# Patient Record
Sex: MALE | Race: WHITE | Employment: OTHER | URBAN - METROPOLITAN AREA
[De-identification: names, ages, dates, MRNs, and addresses within clinical notes are randomized per-mention and may not be internally consistent; named-entity substitution may affect disease eponyms.]

---

## 2017-05-17 ENCOUNTER — HOSPITAL ENCOUNTER (OUTPATIENT)
Dept: NON INVASIVE DIAGNOSTICS | Age: 67
Discharge: HOME | End: 2017-05-17
Attending: FAMILY MEDICINE
Payer: COMMERCIAL

## 2017-05-17 DIAGNOSIS — R07.9 CHEST PAIN ON EXERTION: ICD-10-CM

## 2017-05-17 LAB
ATTENDING PHYSICIAN, CST07: NORMAL
DIAGNOSIS, 93000: NORMAL
DUKE TM SCORE RESULT, CST14: NORMAL
DUKE TREADMILL SCORE, CST13: NORMAL
ECG INTERP BEFORE EX, CST11: NORMAL
ECG INTERP DURING EX, CST12: NORMAL
FUNCTIONAL CAPACITY, CST17: NORMAL
KNOWN CARDIAC CONDITION, CST08: NORMAL
MAX. DIASTOLIC BP, CST04: 80 MMHG
MAX. HEART RATE, CST05: 151 BPM
MAX. SYSTOLIC BP, CST03: 158 MMHG
OVERALL BP RESPONSE TO EXERCISE, CST16: NORMAL
OVERALL HR RESPONSE TO EXERCISE, CST15: NORMAL
PEAK EX METS, CST10: 7 METS
PROTOCOL NAME, CST01: NORMAL
TEST INDICATION, CST09: NORMAL

## 2017-05-17 PROCEDURE — 93017 CV STRESS TEST TRACING ONLY: CPT

## 2017-05-17 NOTE — PROGRESS NOTES
Treadmill Stress test done. Patient had abnormal test. Dr. Carina Trinidad spoke with patient. Will notify odereing doctor. Armband removed.

## 2017-05-18 NOTE — PROCEDURES
[facility]  STRESS TEST (TREADMILL)    Name:  Mónica Starkey  MR#:  [id number]  :  1950  Account #:  [de-identified]  Date of Adm:  2017  Date of Service:  2017      PATIENT OF: Feng Ortiz MD    REASON FOR STUDY: Chest pain. ELECTROCARDIOGRAM RESTING: Normal sinus rhythm, rate 80,  within normal limits. PROCEDURE PROTOCOL: Standard Ryan. TOTAL EXERCISE TIME: 4 minutes 38 seconds. ACHIEVED HEART RATE: 151, 97% of predicted maximum. REASON FOR TERMINATION: Fatigue, dyspnea, and chest pain. ST CHANGES: There was 1.5 mm of upsloping ST depression  inferolaterally at peak exercise, which decreased to approximately 1  mm, but then became downsloping at 5 minutes of recovery,  consistent with a positive test.    BLOOD PRESSURE RESPONSE: Normal.    ARRHYTHMIAS: None. SUMMARY  1. Positive maximal stress test with reproduction of chest pain with  exertion with pain resolving within a minute of recovery, but ST  changes persisting through 5 minutes of recovery. 2. This was at least an intermediate risk exercise treadmill study.         DO CYN Scherer / Marilyn.Alcira  D:  2017   18:46  T:  2017   05:15  Job #:  [id number]

## 2017-07-07 ENCOUNTER — OFFICE VISIT (OUTPATIENT)
Dept: CARDIOLOGY CLINIC | Age: 67
End: 2017-07-07

## 2017-07-07 VITALS
BODY MASS INDEX: 25.2 KG/M2 | HEART RATE: 62 BPM | DIASTOLIC BLOOD PRESSURE: 82 MMHG | SYSTOLIC BLOOD PRESSURE: 122 MMHG | WEIGHT: 176 LBS | OXYGEN SATURATION: 98 % | HEIGHT: 70 IN

## 2017-07-07 DIAGNOSIS — Z01.812 PRE-PROCEDURE LAB EXAM: ICD-10-CM

## 2017-07-07 DIAGNOSIS — R94.39 ABNORMAL STRESS TEST: Primary | ICD-10-CM

## 2017-07-07 RX ORDER — ROSUVASTATIN CALCIUM 10 MG/1
TABLET, COATED ORAL
Refills: 0 | COMMUNITY
Start: 2017-06-21

## 2017-07-07 RX ORDER — ASPIRIN 81 MG/1
81 TABLET ORAL DAILY
COMMUNITY

## 2017-07-07 RX ORDER — SODIUM CHLORIDE 0.9 % (FLUSH) 0.9 %
5-10 SYRINGE (ML) INJECTION AS NEEDED
Status: CANCELLED | OUTPATIENT
Start: 2017-07-07

## 2017-07-07 RX ORDER — SODIUM CHLORIDE 0.9 % (FLUSH) 0.9 %
5-10 SYRINGE (ML) INJECTION EVERY 8 HOURS
Status: CANCELLED | OUTPATIENT
Start: 2017-07-07

## 2017-07-07 NOTE — PROGRESS NOTES
1. Have you been to the ER, urgent care clinic since your last visit? Hospitalized since your last visit? no  2. Have you seen or consulted any other health care providers outside of the [address] [place] [address] since your last visit? Include any pap smears or colon screening.  no

## 2017-07-07 NOTE — PROGRESS NOTES
PATIENT NAME: Rayvon Duane         74 y.o.      1950              DATE:7/7/2017    REASON FOR VISIT: Chest pain    HISTORY OF PRESENT ILLNESS: Approximately 4 month history of exertional substernal and right precordial chest pain. The pains are relieved by rest.  He notices them while exercising in his home gym. Notices them at heart rates of approximately 150-155. Exercise for 4 minutes and 38 seconds on a Ryan protocol. Developed chest pain. Developed ST segment abnormalities in the recovery period that were consistent with myocardial ischemia. The patient has a history of hyperlipidemia. He is not hypertensive or diabetic. He does have a history of cigarette smoking. He quit these approximately 10 years ago. Denies dyspnea on exertion, orthopnea, paroxysmal nocturnal dyspnea. Denies palpitation, syncope, presyncope. There is been no problems with bleeding. PAST MEDICAL HISTORY:   Past Medical History:  05/17/2017: Cardiac treadmill stress test      Comment: At least intermediate risk. Positive EKG on                maximal EST. Chest pain resolved within 1 min                of recovery. ST changes persisted 5 mins of                recovery. Ex time 4 min 38 sec. No date: Mixed hyperlipidemia    PAST SURGICAL HISTORY:   Past Surgical History:  No date: HX TONSILLECTOMY      SOCIAL HISTORY:  Social History    Marital status:              Spouse name:                       Years of education:                 Number of children:               Social History Main Topics    Smoking status: Former Smoker                                                                Packs/day: 1.50      Years: 40.00          Quit date: 1/7/2017    Smokeless status: Never Used                        Alcohol use:  Yes               ALLERGIES:   No Known Allergies     CURRENT MEDICATIONS:   Current Outpatient Prescriptions:  aspirin delayed-release 81 mg tablet, Take 81 mg by mouth daily.  rosuvastatin (CRESTOR) 10 mg tablet, take 1 tablet by mouth once daily    No current facility-administered medications for this visit. REVIEW of SYSTEMS:History obtained from chart review and the patient  Hematological and Lymphatic ROS: negative for - bleeding problems  Respiratory ROS: See history of present illness  Cardiovascular ROS: See history of present illness  Gastrointestinal ROS: no abdominal pain, change in bowel habits, or black or bloody stools     PHYSICAL EXAMINATION:   /82 (BP 1 Location: Left arm, BP Patient Position: Sitting)  Pulse 62  Ht 5' 10\" (1.778 m)  Wt 79.8 kg (176 lb)  SpO2 98%  BMI 25.25 kg/m2  BP Readings from Last 3 Encounters:  07/07/17 : 122/82    Pulse Readings from Last 3 Encounters:  07/07/17 : 62    Wt Readings from Last 3 Encounters:  07/07/17 : 79.8 kg (176 lb)    General: Well-developed white male in no apparent distress. HEENT: Sclera clear. Mucous membranes pink and moist.  Neck: No jugular venous distention. Carotid upstrokes 2+ without bruits. Chest: Clear to  auscultation. Heart: PMI not palpable. Regular rhythm. No murmur or gallop. Abdomen: Nontender without masses or organomegaly. Extremities: No edema. Skin: Warm and dry. No stasis changes. Neuro: Alert, oriented, speech WNL, no facial asymmetry. Gait WNL. EKG: Within normal limits    IMPRESSION:   Chest pain, highly suggestive of angina pectoris  Abnormal exercise EKG consistent with myocardial ischemia  Hyperlipidemia    PLAN:  Recommend coronary arteriography. Patient accepts. Will schedule    The diagnoses and plan were discussed with patient. All questions answered. Plan of care agreed to by all concerned. Herberth Gutierrez.  MD [place]       ,

## 2017-07-07 NOTE — MR AVS SNAPSHOT
Visit Information Date & Time Provider Department Dept. Phone Encounter #  
 7/7/2017 11:20 AM Homer Whipple MD [facility] [id number] Follow-up Instructions Follow-up and Disposition History Upcoming Health Maintenance Date Due Hepatitis C Screening 1950 DTaP/Tdap/Td series (1 - Tdap) 4/13/1973 FOBT Q 1 YEAR AGE 50-75 4/13/2002 ZOSTER VACCINE AGE 60> 4/13/2012 GLAUCOMA SCREENING Q2Y 4/13/2017 Pneumococcal 65+ Low/Medium Risk (1 of 2 - PCV13) 4/13/2017 MEDICARE YEARLY EXAM 4/13/2017 INFLUENZA AGE 9 TO ADULT 8/1/2017 Allergies as of 7/7/2017  Review Complete On: 7/7/2017 By: Hildreth Mcardle No Known Allergies Current Immunizations  Never Reviewed No immunizations on file. Not reviewed this visit You Were Diagnosed With   
  
 Codes Comments Abnormal stress test    -  Primary ICD-10-CM: R94.39 
ICD-9-CM: 794.39 Vitals BP Pulse Height(growth percentile) Weight(growth percentile) SpO2 BMI  
 122/82 (BP 1 Location: Left arm, BP Patient Position: Sitting) 62 5' 10\" (1.778 m) 176 lb (79.8 kg) 98% 25.25 kg/m2 Smoking Status Former Smoker Vitals History BMI and BSA Data Body Mass Index Body Surface Area  
 25.25 kg/m 2 1.99 m 2 Your Updated Medication List  
  
   
This list is accurate as of: 7/7/17 12:10 PM.  Always use your most recent med list.  
  
  
  
  
 aspirin delayed-release 81 mg tablet Take 81 mg by mouth daily. rosuvastatin 10 mg tablet Commonly known as:  CRESTOR  
take 1 tablet by mouth once daily We Performed the Following AMB POC EKG ROUTINE W/ 12 LEADS, INTER & REP [60090 CPT(R)] Patient Instructions Instructions Patients Name:  Talbot Landau 1. You are scheduled to have a left heart catherization on 7/13/2017  at 9:15am Please check in at 8:15am  . 2. Please go to [facility] and park in the outpatient parking lot that is located around to the back of the hospital and enter through the [facility] building. Once you enter through the [facility] check in with the  there. The  will either give you directions or assist you in getting to the cath holding area. 3. You are not to eat anything after midnight the morning of the procedure. Please continue to drink fluids up until 6am.  (water, juice, black coffee, soft drinks). Do not drink milk or milk products or anything with cream. 
 
4. If you are diabetic, do not take your insulin/sugar pill the morning of the procedure. 5. MEDICATION INSTRUCTIONS:   Please take your morning medications with the following special instructions: Take your Aspirin and/or Plavix. 6. We encourage families to wait in the waiting room on the first floor while the procedure is being done. The Doctor will come out and talk with you as soon as the procedure is over. 7. There is the possibility that you may spend the night in the hospital, depending on the results of the procedure. This will be determined after the procedure is done. If angioplasty or stent is planned, you will stay at least one day. 8. If you or your family have any questions, please call our office Monday Friday, 9:00 a. m.4:30 p.m.,  At [phone], and ask to speak to one of the nurses. c reaction to the dye. Introducing [facility]! Troy Ewing introduces [employer] patient portal. Now you can access parts of your medical record, email your doctor's office, and request medication refills online. 1. In your internet browser, go to [email or web address] 2. Click on the First Time User? Click Here link in the Sign In box. You will see the New Member Sign Up page. 3. Enter your [employer] Access Code exactly as it appears below.  You will not need to use this code after youve completed the sign-up process. If you do not sign up before the expiration date, you must request a new code. · [employer] Access Code: [email or web address] Expires: 8/8/2017 11:15 AM 
 
4. Enter the last four digits of your Social Security Number (xxxx) and Date of Birth (mm/dd/yyyy) as indicated and click Submit. You will be taken to the next sign-up page. 5. Create a [employer] ID. This will be your [employer] login ID and cannot be changed, so think of one that is secure and easy to remember. 6. Create a [employer] password. You can change your password at any time. 7. Enter your Password Reset Question and Answer. This can be used at a later time if you forget your password. 8. Enter your e-mail address. You will receive e-mail notification when new information is available in [address]. 9. Click Sign Up. You can now view and download portions of your medical record. 10. Click the Download Summary menu link to download a portable copy of your medical information. If you have questions, please visit the Frequently Asked Questions section of the [employer] website. Remember, [employer] is NOT to be used for urgent needs. For medical emergencies, dial 911. Now available from your iPhone and Android! Please provide this summary of care documentation to your next provider. Your primary care clinician is listed as Napoleon Rios. If you have any questions after today's visit, please call [phone].

## 2017-07-11 ENCOUNTER — HOSPITAL ENCOUNTER (OUTPATIENT)
Dept: GENERAL RADIOLOGY | Age: 67
Discharge: HOME | End: 2017-07-11
Payer: COMMERCIAL

## 2017-07-11 ENCOUNTER — HOSPITAL ENCOUNTER (OUTPATIENT)
Dept: LAB | Age: 67
Discharge: HOME | End: 2017-07-11
Payer: COMMERCIAL

## 2017-07-11 DIAGNOSIS — Z01.812 PRE-PROCEDURE LAB EXAM: ICD-10-CM

## 2017-07-11 DIAGNOSIS — R94.39 ABNORMAL STRESS TEST: ICD-10-CM

## 2017-07-11 LAB
ALBUMIN SERPL BCP-MCNC: 4 G/DL (ref 3.4–5)
ALBUMIN/GLOB SERPL: 1.3 {RATIO} (ref 0.8–1.7)
ALP SERPL-CCNC: 65 U/L (ref 45–117)
ALT SERPL-CCNC: 32 U/L (ref 16–61)
ANION GAP BLD CALC-SCNC: 7 MMOL/L (ref 3–18)
AST SERPL W P-5'-P-CCNC: 16 U/L (ref 15–37)
BASOPHILS # BLD AUTO: 0 K/UL (ref 0–0.06)
BASOPHILS # BLD: 0 % (ref 0–2)
BILIRUB SERPL-MCNC: 0.4 MG/DL (ref 0.2–1)
BUN SERPL-MCNC: 17 MG/DL (ref 7–18)
BUN/CREAT SERPL: 20 (ref 12–20)
CALCIUM SERPL-MCNC: 8.5 MG/DL (ref 8.5–10.1)
CHLORIDE SERPL-SCNC: 108 MMOL/L (ref 100–108)
CO2 SERPL-SCNC: 25 MMOL/L (ref 21–32)
CREAT SERPL-MCNC: 0.84 MG/DL (ref 0.6–1.3)
DIFFERENTIAL METHOD BLD: ABNORMAL
EOSINOPHIL # BLD: 0.1 K/UL (ref 0–0.4)
EOSINOPHIL NFR BLD: 2 % (ref 0–5)
ERYTHROCYTE [DISTWIDTH] IN BLOOD BY AUTOMATED COUNT: 11.9 % (ref 11.6–14.5)
GLOBULIN SER CALC-MCNC: 3 G/DL (ref 2–4)
GLUCOSE SERPL-MCNC: 124 MG/DL (ref 74–99)
HCT VFR BLD AUTO: 41.5 % (ref 36–48)
HGB BLD-MCNC: 14.9 G/DL (ref 13–16)
INR PPP: 1 (ref 0.8–1.2)
LYMPHOCYTES # BLD AUTO: 34 % (ref 21–52)
LYMPHOCYTES # BLD: 1.8 K/UL (ref 0.9–3.6)
MCH RBC QN AUTO: 34.1 PG (ref 24–34)
MCHC RBC AUTO-ENTMCNC: 35.9 G/DL (ref 31–37)
MCV RBC AUTO: 95 FL (ref 74–97)
MONOCYTES # BLD: 0.4 K/UL (ref 0.05–1.2)
MONOCYTES NFR BLD AUTO: 8 % (ref 3–10)
NEUTS SEG # BLD: 2.9 K/UL (ref 1.8–8)
NEUTS SEG NFR BLD AUTO: 56 % (ref 40–73)
PLATELET # BLD AUTO: 152 K/UL (ref 135–420)
PMV BLD AUTO: 10.6 FL (ref 9.2–11.8)
POTASSIUM SERPL-SCNC: 4.3 MMOL/L (ref 3.5–5.5)
PROT SERPL-MCNC: 7 G/DL (ref 6.4–8.2)
PROTHROMBIN TIME: 13.3 SEC (ref 11.5–15.2)
RBC # BLD AUTO: 4.37 M/UL (ref 4.7–5.5)
SODIUM SERPL-SCNC: 140 MMOL/L (ref 136–145)
WBC # BLD AUTO: 5.2 K/UL (ref 4.6–13.2)

## 2017-07-11 PROCEDURE — 80053 COMPREHEN METABOLIC PANEL: CPT

## 2017-07-11 PROCEDURE — 85025 COMPLETE CBC W/AUTO DIFF WBC: CPT

## 2017-07-11 PROCEDURE — 36415 COLL VENOUS BLD VENIPUNCTURE: CPT

## 2017-07-11 PROCEDURE — 71020 XR CHEST PA LAT: CPT

## 2017-07-11 PROCEDURE — 85610 PROTHROMBIN TIME: CPT

## 2017-07-13 ENCOUNTER — HOSPITAL ENCOUNTER (OUTPATIENT)
Dept: CARDIAC CATH/INVASIVE PROCEDURES | Age: 67
Setting detail: OBSERVATION
Discharge: HOME | End: 2017-07-14
Attending: INTERNAL MEDICINE | Admitting: INTERNAL MEDICINE
Payer: COMMERCIAL

## 2017-07-13 PROBLEM — I20.0 UNSTABLE ANGINA (HCC): Status: ACTIVE | Noted: 2017-07-13

## 2017-07-13 PROBLEM — I25.10 CAD (CORONARY ARTERY DISEASE): Status: ACTIVE | Noted: 2017-07-13

## 2017-07-13 LAB
ANION GAP BLD CALC-SCNC: 6 MMOL/L (ref 3–18)
ATRIAL RATE: 47 BPM
BUN SERPL-MCNC: 14 MG/DL (ref 7–18)
BUN/CREAT SERPL: 18 (ref 12–20)
CALCIUM SERPL-MCNC: 7.8 MG/DL (ref 8.5–10.1)
CALCULATED P AXIS, ECG09: 47 DEGREES
CALCULATED R AXIS, ECG10: 73 DEGREES
CALCULATED T AXIS, ECG11: 72 DEGREES
CHLORIDE SERPL-SCNC: 108 MMOL/L (ref 100–108)
CO2 SERPL-SCNC: 25 MMOL/L (ref 21–32)
CREAT SERPL-MCNC: 0.76 MG/DL (ref 0.6–1.3)
DIAGNOSIS, 93000: NORMAL
GLUCOSE BLD STRIP.AUTO-MCNC: 248 MG/DL (ref 70–110)
GLUCOSE SERPL-MCNC: 99 MG/DL (ref 74–99)
P-R INTERVAL, ECG05: 222 MS
POTASSIUM SERPL-SCNC: 3.9 MMOL/L (ref 3.5–5.5)
Q-T INTERVAL, ECG07: 446 MS
QRS DURATION, ECG06: 94 MS
QTC CALCULATION (BEZET), ECG08: 394 MS
SODIUM SERPL-SCNC: 139 MMOL/L (ref 136–145)
VENTRICULAR RATE, ECG03: 47 BPM

## 2017-07-13 PROCEDURE — 74011250636 HC RX REV CODE- 250/636: Performed by: INTERNAL MEDICINE

## 2017-07-13 PROCEDURE — 99218 HC RM OBSERVATION: CPT

## 2017-07-13 PROCEDURE — 74011000258 HC RX REV CODE- 258: Performed by: INTERNAL MEDICINE

## 2017-07-13 PROCEDURE — C1894 INTRO/SHEATH, NON-LASER: HCPCS

## 2017-07-13 PROCEDURE — 77030027845 HC BND COM RDL D-STAT TELE -B

## 2017-07-13 PROCEDURE — C1769 GUIDE WIRE: HCPCS

## 2017-07-13 PROCEDURE — 92928 PRQ TCAT PLMT NTRAC ST 1 LES: CPT

## 2017-07-13 PROCEDURE — 93458 L HRT ARTERY/VENTRICLE ANGIO: CPT

## 2017-07-13 PROCEDURE — 74011000250 HC RX REV CODE- 250: Performed by: INTERNAL MEDICINE

## 2017-07-13 PROCEDURE — 80048 BASIC METABOLIC PNL TOTAL CA: CPT | Performed by: INTERNAL MEDICINE

## 2017-07-13 PROCEDURE — 74011250637 HC RX REV CODE- 250/637: Performed by: INTERNAL MEDICINE

## 2017-07-13 PROCEDURE — 77030028790 HC ACC ST CTRL VLV COPLT ABBT -B

## 2017-07-13 PROCEDURE — 82962 GLUCOSE BLOOD TEST: CPT

## 2017-07-13 PROCEDURE — 99153 MOD SED SAME PHYS/QHP EA: CPT

## 2017-07-13 PROCEDURE — C1874 STENT, COATED/COV W/DEL SYS: HCPCS

## 2017-07-13 PROCEDURE — 77030013797 HC KT TRNSDUC PRSSR EDWD -A

## 2017-07-13 PROCEDURE — 99152 MOD SED SAME PHYS/QHP 5/>YRS: CPT

## 2017-07-13 PROCEDURE — C1887 CATHETER, GUIDING: HCPCS

## 2017-07-13 PROCEDURE — C1725 CATH, TRANSLUMIN NON-LASER: HCPCS

## 2017-07-13 PROCEDURE — 93005 ELECTROCARDIOGRAM TRACING: CPT

## 2017-07-13 PROCEDURE — 74011250636 HC RX REV CODE- 250/636

## 2017-07-13 PROCEDURE — 77030015766

## 2017-07-13 PROCEDURE — 74011636320 HC RX REV CODE- 636/320: Performed by: INTERNAL MEDICINE

## 2017-07-13 RX ORDER — HEPARIN SODIUM 200 [USP'U]/100ML
1000 INJECTION, SOLUTION INTRAVENOUS ONCE
Status: COMPLETED | OUTPATIENT
Start: 2017-07-13 | End: 2017-07-13

## 2017-07-13 RX ORDER — BIVALIRUDIN 250 MG/5ML
0.75 INJECTION, POWDER, LYOPHILIZED, FOR SOLUTION INTRAVENOUS ONCE
Status: COMPLETED | OUTPATIENT
Start: 2017-07-13 | End: 2017-07-13

## 2017-07-13 RX ORDER — METOPROLOL SUCCINATE 25 MG/1
12.5 TABLET, EXTENDED RELEASE ORAL DAILY
Status: DISCONTINUED | OUTPATIENT
Start: 2017-07-13 | End: 2017-07-14 | Stop reason: HOSPADM

## 2017-07-13 RX ORDER — LIDOCAINE HYDROCHLORIDE 10 MG/ML
1-30 INJECTION, SOLUTION EPIDURAL; INFILTRATION; INTRACAUDAL; PERINEURAL
Status: DISCONTINUED | OUTPATIENT
Start: 2017-07-13 | End: 2017-07-13 | Stop reason: HOSPADM

## 2017-07-13 RX ORDER — METOPROLOL SUCCINATE 25 MG/1
12.5 TABLET, EXTENDED RELEASE ORAL DAILY
Qty: 30 TAB | Refills: 6 | Status: SHIPPED | OUTPATIENT
Start: 2017-07-13 | End: 2017-10-17 | Stop reason: SDUPTHER

## 2017-07-13 RX ORDER — SODIUM CHLORIDE 0.9 % (FLUSH) 0.9 %
5-10 SYRINGE (ML) INJECTION AS NEEDED
Status: DISCONTINUED | OUTPATIENT
Start: 2017-07-13 | End: 2017-07-13 | Stop reason: HOSPADM

## 2017-07-13 RX ORDER — NITROGLYCERIN 400 UG/1
1 SPRAY ORAL
Status: ACTIVE | OUTPATIENT
Start: 2017-07-13 | End: 2017-07-14

## 2017-07-13 RX ORDER — ATROPINE SULFATE 0.1 MG/ML
INJECTION INTRAVENOUS
Status: DISCONTINUED
Start: 2017-07-13 | End: 2017-07-13

## 2017-07-13 RX ORDER — ROSUVASTATIN CALCIUM 10 MG/1
10 TABLET, COATED ORAL
Status: DISCONTINUED | OUTPATIENT
Start: 2017-07-13 | End: 2017-07-14 | Stop reason: HOSPADM

## 2017-07-13 RX ORDER — HEPARIN SODIUM 200 [USP'U]/100ML
1000 INJECTION, SOLUTION INTRAVENOUS ONCE
Status: DISCONTINUED | OUTPATIENT
Start: 2017-07-13 | End: 2017-07-13 | Stop reason: SDUPTHER

## 2017-07-13 RX ORDER — ASPIRIN 81 MG/1
81 TABLET ORAL DAILY
Status: DISCONTINUED | OUTPATIENT
Start: 2017-07-13 | End: 2017-07-14 | Stop reason: HOSPADM

## 2017-07-13 RX ORDER — SODIUM CHLORIDE 0.9 % (FLUSH) 0.9 %
5-10 SYRINGE (ML) INJECTION EVERY 8 HOURS
Status: DISCONTINUED | OUTPATIENT
Start: 2017-07-13 | End: 2017-07-14 | Stop reason: HOSPADM

## 2017-07-13 RX ORDER — VERAPAMIL HYDROCHLORIDE 2.5 MG/ML
5 INJECTION, SOLUTION INTRAVENOUS ONCE
Status: COMPLETED | OUTPATIENT
Start: 2017-07-13 | End: 2017-07-13

## 2017-07-13 RX ORDER — SODIUM CHLORIDE 0.9 % (FLUSH) 0.9 %
5-10 SYRINGE (ML) INJECTION EVERY 8 HOURS
Status: DISCONTINUED | OUTPATIENT
Start: 2017-07-13 | End: 2017-07-13 | Stop reason: HOSPADM

## 2017-07-13 RX ORDER — SODIUM CHLORIDE 0.9 % (FLUSH) 0.9 %
5-10 SYRINGE (ML) INJECTION AS NEEDED
Status: DISCONTINUED | OUTPATIENT
Start: 2017-07-13 | End: 2017-07-14 | Stop reason: HOSPADM

## 2017-07-13 RX ORDER — SODIUM CHLORIDE 450 MG/100ML
150 INJECTION, SOLUTION INTRAVENOUS CONTINUOUS
Status: DISPENSED | OUTPATIENT
Start: 2017-07-13 | End: 2017-07-13

## 2017-07-13 RX ORDER — HEPARIN SODIUM 1000 [USP'U]/ML
1000-10000 INJECTION, SOLUTION INTRAVENOUS; SUBCUTANEOUS
Status: DISCONTINUED | OUTPATIENT
Start: 2017-07-13 | End: 2017-07-13 | Stop reason: HOSPADM

## 2017-07-13 RX ORDER — FENTANYL CITRATE 50 UG/ML
25-100 INJECTION, SOLUTION INTRAMUSCULAR; INTRAVENOUS
Status: DISCONTINUED | OUTPATIENT
Start: 2017-07-13 | End: 2017-07-13 | Stop reason: HOSPADM

## 2017-07-13 RX ORDER — NITROGLYCERIN 400 UG/1
1 SPRAY ORAL
Qty: 1 BOTTLE | Refills: 2 | Status: SHIPPED | OUTPATIENT
Start: 2017-07-13

## 2017-07-13 RX ORDER — BIVALIRUDIN 250 MG/5ML
INJECTION, POWDER, LYOPHILIZED, FOR SOLUTION INTRAVENOUS
Status: COMPLETED
Start: 2017-07-13 | End: 2017-07-13

## 2017-07-13 RX ORDER — MIDAZOLAM HYDROCHLORIDE 1 MG/ML
1-4 INJECTION, SOLUTION INTRAMUSCULAR; INTRAVENOUS
Status: DISCONTINUED | OUTPATIENT
Start: 2017-07-13 | End: 2017-07-13 | Stop reason: HOSPADM

## 2017-07-13 RX ORDER — ASPIRIN 81 MG/1
81 TABLET ORAL DAILY
Status: DISCONTINUED | OUTPATIENT
Start: 2017-07-13 | End: 2017-07-13 | Stop reason: SDUPTHER

## 2017-07-13 RX ORDER — ASPIRIN 81 MG/1
81 TABLET ORAL DAILY
Status: DISCONTINUED | OUTPATIENT
Start: 2017-07-13 | End: 2017-07-13

## 2017-07-13 RX ADMIN — Medication 10 ML: at 22:34

## 2017-07-13 RX ADMIN — NITROGLYCERIN 200 MCG: 5 INJECTION, SOLUTION INTRAVENOUS at 10:35

## 2017-07-13 RX ADMIN — ROSUVASTATIN CALCIUM 10 MG: 10 TABLET, FILM COATED ORAL at 22:30

## 2017-07-13 RX ADMIN — BIVALIRUDIN 60 MG: 250 INJECTION, POWDER, LYOPHILIZED, FOR SOLUTION INTRAVENOUS at 10:01

## 2017-07-13 RX ADMIN — HEPARIN SODIUM 1000 UNITS: 200 INJECTION, SOLUTION INTRAVENOUS at 09:37

## 2017-07-13 RX ADMIN — NITROGLYCERIN 200 MCG: 5 INJECTION, SOLUTION INTRAVENOUS at 09:48

## 2017-07-13 RX ADMIN — SODIUM CHLORIDE 150 ML: 4.5 INJECTION, SOLUTION INTRAVENOUS at 11:30

## 2017-07-13 RX ADMIN — TICAGRELOR 90 MG: 90 TABLET ORAL at 22:31

## 2017-07-13 RX ADMIN — HEPARIN SODIUM 1000 UNITS: 200 INJECTION, SOLUTION INTRAVENOUS at 09:38

## 2017-07-13 RX ADMIN — NITROGLYCERIN 100 MCG: 5 INJECTION, SOLUTION INTRAVENOUS at 09:53

## 2017-07-13 RX ADMIN — Medication 10 ML: at 14:45

## 2017-07-13 RX ADMIN — TICAGRELOR 180 MG: 90 TABLET ORAL at 10:38

## 2017-07-13 RX ADMIN — MIDAZOLAM HYDROCHLORIDE 1 MG: 1 INJECTION, SOLUTION INTRAMUSCULAR; INTRAVENOUS at 09:43

## 2017-07-13 RX ADMIN — IOPAMIDOL 185 ML: 612 INJECTION, SOLUTION INTRAVENOUS at 10:36

## 2017-07-13 RX ADMIN — LIDOCAINE HYDROCHLORIDE 2 ML: 10 INJECTION, SOLUTION EPIDURAL; INFILTRATION; INTRACAUDAL; PERINEURAL at 09:46

## 2017-07-13 RX ADMIN — VERAPAMIL HYDROCHLORIDE 5 MG: 2.5 INJECTION INTRAVENOUS at 09:48

## 2017-07-13 RX ADMIN — FENTANYL CITRATE 25 MCG: 50 INJECTION INTRAMUSCULAR; INTRAVENOUS at 09:43

## 2017-07-13 RX ADMIN — METOPROLOL SUCCINATE 12.5 MG: 25 TABLET, EXTENDED RELEASE ORAL at 12:53

## 2017-07-13 RX ADMIN — BIVALIRUDIN 1.75 MG/KG/HR: 250 INJECTION, POWDER, LYOPHILIZED, FOR SOLUTION INTRAVENOUS at 10:05

## 2017-07-13 RX ADMIN — HEPARIN SODIUM 3000 UNITS: 1000 INJECTION, SOLUTION INTRAVENOUS; SUBCUTANEOUS at 09:51

## 2017-07-13 RX ADMIN — FENTANYL CITRATE 25 MCG: 50 INJECTION INTRAMUSCULAR; INTRAVENOUS at 09:37

## 2017-07-13 RX ADMIN — MIDAZOLAM HYDROCHLORIDE 1 MG: 1 INJECTION, SOLUTION INTRAMUSCULAR; INTRAVENOUS at 09:37

## 2017-07-13 RX ADMIN — ASPIRIN 81 MG: 81 TABLET, COATED ORAL at 22:30

## 2017-07-13 NOTE — DISCHARGE SUMMARY
[facility]  -  Progress Note      Discharge Summary     Patient: Yaneth Dillon MRN: [id number]  SSN: [id number]    YOB: 1950  Age: 74 y.o. Sex: male       Admit Date: 2017    Discharge Date: 2017      Admission Diagnoses: Chest pain, unspecified [R07.9]    Discharge Diagnoses:   Problem List as of 2017  Never Reviewed          Codes Class Noted - Resolved    CAD (coronary artery disease) ICD-10-CM: I25.10  ICD-9-CM: 414.00  2017 - Present        Unstable angina ([address] ICD-10-CM: I20.0  ICD-9-CM: 411.1  2017 - Present               Discharge Condition: Good    Hospital Course: Patient with recent unstable angina and abnormal stress test presented for elective cardiac catheterization. Patient is now s/p successful stenting of proximal RCA 95% lesion and super dominant vessel, as well as 99% proximal/mid LAD lesion to 0%. Drug-eluting stents were utilized in both vessels    Consults: None    Significant Diagnostic Studies: angiography:     HEMODYNAMIC / ANGIOGRAPHIC DATA  · Left ventricle: End diastolic pressure was 11 mmHg. Left ventriculography: The EF was estimated to be around 55 %. There was no diagnostic segmental wall motion abnormality. · Aorta: There was no significant systolic pressure gradient across the aortic valve. · Mitral valve: There was no significant mitral regurgitation. · Coronary Angiography:  · The coronary circulation was right dominant. · Left main: Angiographically normal.   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 · Diagonal Branches: Small to moderate size vessel with ostial 90%.   · Circumflex: Angiographically normal.  · Obtuse Marginal Branches: Angiographically normal.  · Right coronary: Super dominant with proximal 95%.       Disposition: home    Discharge Medications:   Current Discharge Medication List      START taking these medications    Details   metoprolol succinate (TOPROL-XL) 25 mg XL tablet Take 0.5 Tabs by mouth daily. Qty: 30 Tab, Refills: 6      nitroglycerin (NITROLINGUAL) 400 mcg/spray spray 1 Spray by SubLINGual route every five (5) minutes as needed. Qty: 1 Bottle, Refills: 2      ticagrelor (BRILINTA) 90 mg tablet Take 1 Tab by mouth every twelve (12) hours every twelve (12) hours. Qty: 60 Tab, Refills: 11         CONTINUE these medications which have NOT CHANGED    Details   aspirin delayed-release 81 mg tablet Take 81 mg by mouth daily.     Associated Diagnoses: Abnormal stress test; Pre-procedure lab exam      rosuvastatin (CRESTOR) 10 mg tablet take 1 tablet by mouth once daily  Refills: 0    Associated Diagnoses: Abnormal stress test; Pre-procedure lab exam             Activity: Activity as tolerated  Diet: Cardiac Diet  Wound Care: As directed    Follow-up Appointments   Procedures    FOLLOW UP VISIT Appointment in: One Month Dr. Karrie Geller     Standing Status:   Standing     Number of Occurrences:   1     Order Specific Question:   Appointment in     Answer:   One Month       Signed By: ABDI Cui     July 13, 2017

## 2017-07-13 NOTE — ROUTINE PROCESS
TRANSFER - OUT REPORT:    Verbal report given to BART Dong (name) on Alexandra To  being transferred to Cath Lab(unit) for ordered procedure       Report consisted of patients Situation, Background, Assessment and   Recommendations(SBAR). Information from the following report(s) SBAR, MAR and Recent Results was reviewed with the receiving nurse. Lines:   Peripheral IV 07/13/17 Left Arm (Active)       Peripheral IV 07/13/17 Right Arm (Active)        Opportunity for questions and clarification was provided.       Patient transported with:   [employer]

## 2017-07-13 NOTE — PROCEDURES
CARDIAC CATHETERIZATION LABORATORY PROCEDURE REPORT    Talbot Landau is a 74 y.o. male    Medical Record Number: [id number]    Primary Care Provider: Louis Guevara MD      Referral Provider: Elba Espinoza MD    Procedure Date: 7/13/2017    INDICATIONS: Patient with unstable angina, reproducible chest pain on exercise stress test which was abnormal.    MD PERFORMING PROCEDURE: Jun 1912 Fitzgerald MD Nhung    PROCEDURE:  Left heart catheterization, coronary angiography, left ventriculography and coronary artery stenting. ANESTHESIA: Moderate sedation and local anesthesia. EBL: 30-50 ml  Contrast: 185 ml  Radiation exposure: 2380 mGy    Risks, benefits, and alternatives were explained to the patient and appropriate informed consent was obtained prior to the procedure. The patient was brought to the cath lab in a post-absorptive state and he was prepped and draped in the usual sterile manner. Moderate sedation was achieved with the combination of Versed (midazolam) and Fentanyl. Lidocaine was used to secure local anesthesia. The right radial artery was cannulated using a micropuncture kit and a 6F Western Colleen sheath was inserted. The patient received 3000 units of IV Heparin bolus as well as 5 mg Verapamil and 200 microgram NTG through the sheath to prevent arterial vasospasm. Six Western Colleen Mylene Savin catheter was used to obtain selective bilateral coronary angiograms in multiple projections as well as LV angiography in the BALTAZAR projection with a hand injection. Pressures were recorded in the LV and during pull back across the aortic valve. The following were observed. FLUOROSCOPY: There was mild apparent calcification. HEMODYNAMIC / ANGIOGRAPHIC DATA  · Left ventricle: End diastolic pressure was 11 mmHg. Left ventriculography: The EF was estimated to be around 55 %. There was no diagnostic segmental wall motion abnormality. · Aorta:  There was no significant systolic pressure gradient across the aortic valve.  · Mitral valve: There was no significant mitral regurgitation. · Coronary Angiography:  · The coronary circulation was right dominant. · Left main: Angiographically normal.   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 · Diagonal Branches: Small to moderate size vessel with ostial 90%. · Circumflex: Angiographically normal.  · Obtuse Marginal Branches: Angiographically normal.  · Right coronary: Super dominant with proximal 95%. INTERVENTION:  A weight adjusted loading dose of IV Angiomax was given and infusion of the drug was started . Using a 6 Western Colleen JR4 guiding catheter, the lesion in the right coronary artery was crossed with a 0.014\" Balance guide wire. Predilatation was performed with 3.0 mm PTCA balloon (15 mm length) followed by stenting with 4.0 mm Xience RADHA (15 mm length) stent. Additional balloon expansion with 5 mm balloon was carried out using higher inflation pressures. Intracoronary NTG was used during the procedure. Excellent angiographic results were observed and STEFANI 3 flow was noted after the PCI. Using a 6 Western Colleen AL-1 guiding catheter, the lesion in the LAD coronary artery was crossed with a 0.014\" Balance guide wire. Predilatation was performed with 3.0 mm PTCA balloon ( 15 mm length) followed by stenting with 4.0 mm Xience RADHA (15 mm length) stent. Additional balloon expansion was carried out using higher inflation pressures. Intracoronary NTG was used during the procedure. Excellent angiographic results were observed and STEFANI 3 flow was noted after the PCI. After an appropriate waiting phase final coronary angiograms were obtained and the catheter was withdrawn. Oral antiplatelet therapy was administered in the cardiac cath lab in the form of aspirin 81 mg and Ticagrelor 180 mg. The procedure was well tolerated and there was no apparent immediate complication.  The sheath was removed and hemostasis was accomplished using D-Stat Hemoband. The patient was transferred to the observation area with stable vital signs and in an asymptomatic state. SUMMARY:  Successful stenting of proximal RCA 95% lesion and super dominant vessel, as well as 99% proximal/mid LAD lesion to 0%. Drug-eluting stents were utilized in both vessels. No apparent immediate complication. Moderate sedation was utilized for 68 minutes using Versed and fentanyl under my supervision. RECOMMENDATIONS:  Post-procedure care will focus on aggressive risk factor modification. Benefits of tobacco abstinence discussed.     Electronically signed Sangeeta Caldera MD

## 2017-07-13 NOTE — H&P
PATIENT NAME: Yaneth January         74 y.o.      1950              DATE:7/7/2017     REASON FOR VISIT: Chest pain     HISTORY OF PRESENT ILLNESS: Approximately 4 month history of exertional substernal and right precordial chest pain. The pains are relieved by rest.  He notices them while exercising in his home gym. Notices them at heart rates of approximately 150-155. Exercise for 4 minutes and 38 seconds on a Ryan protocol. Developed chest pain. Developed ST segment abnormalities in the recovery period that were consistent with myocardial ischemia.     The patient has a history of hyperlipidemia. He is not hypertensive or diabetic. He does have a history of cigarette smoking. He quit these approximately 10 years ago.     Denies dyspnea on exertion, orthopnea, paroxysmal nocturnal dyspnea. Denies palpitation, syncope, presyncope.     There is been no problems with bleeding.     PAST MEDICAL HISTORY:   Past Medical History:  05/17/2017: Cardiac treadmill stress test      Comment: At least intermediate risk. Positive EKG on                maximal EST. Chest pain resolved within 1 min                of recovery. ST changes persisted 5 mins of                recovery. Ex time 4 min 38 sec. No date: Mixed hyperlipidemia     PAST SURGICAL HISTORY:   Past Surgical History:  No date: HX TONSILLECTOMY      SOCIAL HISTORY:  Social History    Marital status:              Spouse name:                       Years of education:                 Number of children:                Social History Main Topics    Smoking status: Former Smoker                                                                Packs/day: 1.50      Years: 40.00          Quit date: 1/7/2017    Smokeless status: Never Used                        Alcohol use:  Yes                 ALLERGIES:   No Known Allergies      CURRENT MEDICATIONS:   Current Outpatient Prescriptions:  aspirin delayed-release 81 mg tablet, Take 81 mg by mouth daily. rosuvastatin (CRESTOR) 10 mg tablet, take 1 tablet by mouth once daily     No current facility-administered medications for this visit.         REVIEW of SYSTEMS:History obtained from chart review and the patient  Hematological and Lymphatic ROS: negative for - bleeding problems  Respiratory ROS: See history of present illness  Cardiovascular ROS: See history of present illness  Gastrointestinal ROS: no abdominal pain, change in bowel habits, or black or bloody stools      PHYSICAL EXAMINATION:   /82 (BP 1 Location: Left arm, BP Patient Position: Sitting)  Pulse 62  Ht 5' 10\" (1.778 m)  Wt 79.8 kg (176 lb)  SpO2 98%  BMI 25.25 kg/m2  BP Readings from Last 3 Encounters:  07/07/17 : 122/82     Pulse Readings from Last 3 Encounters:  07/07/17 : 62     Wt Readings from Last 3 Encounters:  07/07/17 : 79.8 kg (176 lb)     General: Well-developed white male in no apparent distress. HEENT: Sclera clear. Mucous membranes pink and moist.  Neck: No jugular venous distention. Carotid upstrokes 2+ without bruits. Chest: Clear to  auscultation. Heart: PMI not palpable. Regular rhythm. No murmur or gallop. Abdomen: Nontender without masses or organomegaly. Extremities: No edema. Skin: Warm and dry. No stasis changes. Neuro: Alert, oriented, speech WNL, no facial asymmetry. Gait WNL.    EKG: Within normal limits     IMPRESSION:   Chest pain, highly suggestive of angina pectoris  Abnormal exercise EKG consistent with myocardial ischemia  Hyperlipidemia     PLAN:  Recommend coronary arteriography. Patient accepts. Will schedule     The diagnoses and plan were discussed with patient. All questions answered. Plan of care agreed to by all concerned.  Ade Vasquez. MD [place]      Addendum:    Abnormal stress test with exertional chest pains. Will proceed with coronary angiography.     [facility]  7/13/17

## 2017-07-13 NOTE — ROUTINE PROCESS
TRANSFER - OUT REPORT:    Verbal report given to [facility] RN(name) on Arsalan Ewing  being transferred to [facility]) for routine progression of care       Report consisted of patients Situation, Background, Assessment and   Recommendations(SBAR). Information from the following report(s) SBAR, Procedure Summary, Intake/Output and MAR was reviewed with the receiving nurse. Lines:   Peripheral IV 07/13/17 Left Arm (Active)       Peripheral IV 07/13/17 Right Arm (Active)        Opportunity for questions and clarification was provided.       Patient transported with:   [employer]

## 2017-07-13 NOTE — ROUTINE PROCESS
TRANSFER - IN REPORT:    Verbal report received from Lennox Vuong RN(name) on Rhianna Levy  being received from [facility](unit) for routine progression of care      Report consisted of patients Situation, Background, Assessment and   Recommendations(SBAR). Information from the following report(s) SBAR and Procedure Summary was reviewed with the receiving nurse. Opportunity for questions and clarification was provided. Assessment completed upon patients arrival to unit and care assumed.

## 2017-07-13 NOTE — PROGRESS NOTES
met with patient, completed the initial Spiritual Assessment of the patient, and offered Pastoral Care, see flow sheets for interventions. Patient said he is [employer]. He said he is in hospital overnight for a procedure and hopes to be discharged tomorrow. Pastoral support provided. Patient does not have any Denominational/cultural needs that will affect patients preferences in health care. Chart reviewed. Chaplains will continue to follow and will provide pastoral care on an as needed/as requested basis. Haroon Olson MDiv.   Board Certified [employer] [phone]

## 2017-07-13 NOTE — ROUTINE PROCESS
TRANSFER - OUT REPORT:    Verbal report given to CVT Stepdown Nurse, Mitchel Flor (name) on Zach Shown  being transferred to CVT , 2309(unit) for routine progression of care       Report consisted of patients Situation, Background, Assessment and   Recommendations(SBAR). Information from the following report(s) SBAR, Procedure Summary, MAR and Recent Results was reviewed with the receiving nurse. Lines:   Peripheral IV 07/13/17 Left Arm (Active)       Peripheral IV 07/13/17 Right Arm (Active)        Opportunity for questions and clarification was provided.       Patient transported with:   Registered Nurse

## 2017-07-13 NOTE — PROGRESS NOTES
D Stat pad removed/bandage applied and arm/wrist restrictions reviewed with patient. Wrist splint applied.

## 2017-07-13 NOTE — IP AVS SNAPSHOT
[id number] [place] [address] 
[phone] Patient: Rhianna Levy MRN: [id number] RCX:[id number] You are allergic to the following No active allergies Recent Documentation Height Weight BMI Smoking Status 1.778 m 84.8 kg 26.82 kg/m2 Former Smoker Emergency Contacts Name Discharge Info Relation Home Work Mobile Monika Escalante DISCHARGE CAREGIVER [3] Spouse [3]   [phone] About your hospitalization You were admitted on:  July 13, 2017 You last received care in the:  [facility] 2 CV STEPDOWN You were discharged on:  July 14, 2017 Unit phone number:  [phone] Why you were hospitalized Your primary diagnosis was:  Not on File Your diagnoses also included:  Cad (Coronary Artery Disease), Unstable Angina (Hcc) Providers Seen During Your Hospitalizations Provider Role Specialty Primary office phone Yahir Ramirez MD Attending Provider Cardiology [phone] Your Primary Care Physician (PCP) Primary Care Physician Office Phone Office Fax Pamela Banks, [address] [place] [address] [phone] Follow-up Information Follow up With Details Comments Contact Info Kristan Canales MD Schedule an appointment as soon as possible for a visit in 1 week  [facility] 
[phone] Seymour Kawasaki, MD Schedule an appointment as soon as possible for a visit in 1 month  [address] [facility] [address] [facility] 
[phone] Current Discharge Medication List  
  
START taking these medications Dose & Instructions Dispensing Information Comments Morning Noon Evening Bedtime  
 metoprolol succinate 25 mg XL tablet Commonly known as:  TOPROL-XL Dose:  12.5 mg Take 0.5 Tabs by mouth daily. Quantity:  30 Tab Refills:  6  
     
  
   
   
   
  
 nitroglycerin 400 mcg/spray spray Commonly known as:  Tao Colin Dose:  1 Spray 1 Spray by SubLINGual route every five (5) minutes as needed. Quantity:  1 Bottle Refills:  2  
     
   
   
   
  
 * ticagrelor 90 mg tablet Commonly known as:  [employer] Dose:  90 mg Take 1 Tab by mouth every twelve (12) hours every twelve (12) hours. Quantity:  60 Tab Refills:  11  
     
  
   
   
  
   
  
 * ticagrelor 90 mg tablet Commonly known as:  [employer] Dose:  90 mg Take 1 Tab by mouth two (2) times a day. Quantity:  60 Tab Refills:  0  
     
   
   
   
  
 * Notice: This list has 2 medication(s) that are the same as other medications prescribed for you. Read the directions carefully, and ask your doctor or other care provider to review them with you. CONTINUE these medications which have NOT CHANGED Dose & Instructions Dispensing Information Comments Morning Noon Evening Bedtime  
 aspirin delayed-release 81 mg tablet Dose:  81 mg Take 81 mg by mouth daily. Refills:  0  
     
   
   
   
  
 rosuvastatin 10 mg tablet Commonly known as:  CRESTOR  
   
 take 1 tablet by mouth once daily Refills:  0 Where to Get Your Medications Information on where to get these meds will be given to you by the nurse or doctor. ! Ask your nurse or doctor about these medications  
  metoprolol succinate 25 mg XL tablet  
 nitroglycerin 400 mcg/spray spray  
 ticagrelor 90 mg tablet  
 ticagrelor 90 mg tablet Discharge Instructions Cardiac Catheterization/Angiography Discharge Instructions *Check the puncture site frequently for swelling or bleeding. If you see any bleeding, lie down and apply pressure over the area with a clean town or washcloth. Notify your doctor for any redness, swelling, drainage or oozing from the puncture site. Notify your doctor for any fever or chills. *If the leg or arm with the puncture becomes cold, numb or painful, call Dr Greg Sharp. *Activity should be limited for the next 48 hours. Climb stairs as little as possible and avoid any stooping, bending or strenuous activity for 48 hours. No heavy lifting (anything over 10 pounds) for three days. *Do not drive for 48 hours. *You may resume your usual diet. Drink more fluids than usual. 
 
*Have a responsible person drive you home and stay with you for at least 24 hours after your heart catheterization/angiography. *You may remove the bandage from your Right and Arm in 24 hours. You may shower in 24 hours. No tub baths, hot tubs or swimming for one week. Do not place any lotions, creams, powders, ointments over the puncture site for one week. You may place a clean band-aid over the puncture site each day for 5 days. Change this daily. DISCHARGE SUMMARY from Nurse The following personal items are in your possession at time of discharge: 
 
Dental Appliances: None Visual Aid: None Home Medications: None Jewelry: Watch Clothing: At bedside Other Valuables: Cell Phone, Personal electronic devices (comment) (ipad) PATIENT INSTRUCTIONS: 
 
 
F-face looks uneven A-arms unable to move or move unevenly S-speech slurred or non-existent T-time-call 911 as soon as signs and symptoms begin-DO NOT go Back to bed or wait to see if you get better-TIME IS BRAIN. Warning Signs of HEART ATTACK Call 911 if you have these symptoms: 
? Chest discomfort. Most heart attacks involve discomfort in the center of the chest that lasts more than a few minutes, or that goes away and comes back. It can feel like uncomfortable pressure, squeezing, fullness, or pain. ? Discomfort in other areas of the upper body. Symptoms can include pain or discomfort in one or both arms, the back, neck, jaw, or stomach. ? Shortness of breath with or without chest discomfort. ? Other signs may include breaking out in a cold sweat, nausea, or lightheadedness. Don't wait more than five minutes to call [address]! Fast action can save your life. Calling 911 is almost always the fastest way to get lifesaving treatment. Emergency Medical Services staff can begin treatment when they arrive  up to an hour sooner than if someone gets to the hospital by car. The discharge information has been reviewed with the patient. The patient verbalized understanding. Discharge medications reviewed with the patient and appropriate educational materials and side effects teaching were provided. Discharge Orders None [employer] Announcement We are excited to announce that we are making your provider's discharge notes available to you in [employer]. You will see these notes when they are completed and signed by the physician that discharged you from your recent hospital stay. If you have any questions or concerns about any information you see in [employer], please call the Health Information Department where you were seen or reach out to your Primary Care Provider for more information about your plan of care. Introducing [facility]! Troy Ewing introduces [employer] patient portal. Now you can access parts of your medical record, email your doctor's office, and request medication refills online. 1. In your internet browser, go to [email or web address]"[email or web address]. [employer] [employer] 2. Click on the First Time User? Click Here link in the Sign In box. You will see the New Member Sign Up page. 3. Enter your [employer] Access Code exactly as it appears below. You will not need to use this code after youve completed the sign-up process.  If you do not sign up before the expiration date, you must request a new code. · [employer] Access Code: [email or web address] Expires: 8/8/2017 11:15 AM 
 
4. Enter the last four digits of your Social Security Number (xxxx) and Date of Birth (mm/dd/yyyy) as indicated and click Submit. You will be taken to the next sign-up page. 5. Create a [employer] ID. This will be your [employer] login ID and cannot be changed, so think of one that is secure and easy to remember. 6. Create a [employer] password. You can change your password at any time. 7. Enter your Password Reset Question and Answer. This can be used at a later time if you forget your password. 8. Enter your e-mail address. You will receive e-mail notification when new information is available in [address] 9. Click Sign Up. You can now view and download portions of your medical record. 10. Click the Download Summary menu link to download a portable copy of your medical information. If you have questions, please visit the Frequently Asked Questions section of the [employer] website. Remember, [employer] is NOT to be used for urgent needs. For medical emergencies, dial 911. Now available from your iPhone and Android! General Information Please provide this summary of care documentation to your next provider. Patient Signature:  ____________________________________________________________ Date:  ____________________________________________________________  
  
Lindon Rafal Provider Signature:  ____________________________________________________________ Date:  ____________________________________________________________

## 2017-07-13 NOTE — PROGRESS NOTES
Assumed care of patient approx [phone]; admission required documents completed by ENRIQUE Solis. Oriented patient to room; tele on; vitals WNL; right wrist dressing dry/intact.

## 2017-07-13 NOTE — IP AVS SNAPSHOT
Current Discharge Medication List  
  
START taking these medications Dose & Instructions Dispensing Information Comments Morning Noon Evening Bedtime  
 metoprolol succinate 25 mg XL tablet Commonly known as:  TOPROL-XL Dose:  12.5 mg Take 0.5 Tabs by mouth daily. Quantity:  30 Tab Refills:  6  
     
  
   
   
   
  
 nitroglycerin 400 mcg/spray spray Commonly known as:  Merlene Burkai Dose:  1 Spray 1 Spray by SubLINGual route every five (5) minutes as needed. Quantity:  1 Bottle Refills:  2  
     
   
   
   
  
 * ticagrelor 90 mg tablet Commonly known as:  [employer] Dose:  90 mg Take 1 Tab by mouth every twelve (12) hours every twelve (12) hours. Quantity:  60 Tab Refills:  11  
     
  
   
   
  
   
  
 * ticagrelor 90 mg tablet Commonly known as:  [employer] Dose:  90 mg Take 1 Tab by mouth two (2) times a day. Quantity:  60 Tab Refills:  0  
     
   
   
   
  
 * Notice: This list has 2 medication(s) that are the same as other medications prescribed for you. Read the directions carefully, and ask your doctor or other care provider to review them with you. CONTINUE these medications which have NOT CHANGED Dose & Instructions Dispensing Information Comments Morning Noon Evening Bedtime  
 aspirin delayed-release 81 mg tablet Dose:  81 mg Take 81 mg by mouth daily. Refills:  0  
     
   
   
   
  
 rosuvastatin 10 mg tablet Commonly known as:  CRESTOR  
   
 take 1 tablet by mouth once daily Refills:  0 Where to Get Your Medications Information on where to get these meds will be given to you by the nurse or doctor. ! Ask your nurse or doctor about these medications  
  metoprolol succinate 25 mg XL tablet  
 nitroglycerin 400 mcg/spray spray  
 ticagrelor 90 mg tablet  
 ticagrelor 90 mg tablet

## 2017-07-14 VITALS
RESPIRATION RATE: 16 BRPM | WEIGHT: 186.9 LBS | TEMPERATURE: 97.5 F | BODY MASS INDEX: 26.76 KG/M2 | HEART RATE: 53 BPM | HEIGHT: 70 IN | OXYGEN SATURATION: 96 % | DIASTOLIC BLOOD PRESSURE: 75 MMHG | SYSTOLIC BLOOD PRESSURE: 136 MMHG

## 2017-07-14 LAB
ATRIAL RATE: 62 BPM
CALCULATED P AXIS, ECG09: 62 DEGREES
CALCULATED R AXIS, ECG10: 51 DEGREES
CALCULATED T AXIS, ECG11: 65 DEGREES
DIAGNOSIS, 93000: NORMAL
P-R INTERVAL, ECG05: 202 MS
Q-T INTERVAL, ECG07: 408 MS
QRS DURATION, ECG06: 80 MS
QTC CALCULATION (BEZET), ECG08: 414 MS
VENTRICULAR RATE, ECG03: 62 BPM

## 2017-07-14 PROCEDURE — 74011250637 HC RX REV CODE- 250/637: Performed by: INTERNAL MEDICINE

## 2017-07-14 PROCEDURE — 93005 ELECTROCARDIOGRAM TRACING: CPT

## 2017-07-14 PROCEDURE — 99218 HC RM OBSERVATION: CPT

## 2017-07-14 RX ADMIN — TICAGRELOR 90 MG: 90 TABLET ORAL at 09:17

## 2017-07-14 RX ADMIN — Medication 10 ML: at 06:00

## 2017-07-14 RX ADMIN — METOPROLOL SUCCINATE 12.5 MG: 25 TABLET, EXTENDED RELEASE ORAL at 09:17

## 2017-07-14 NOTE — DISCHARGE INSTRUCTIONS
Cardiac Catheterization/Angiography Discharge Instructions    *Check the puncture site frequently for swelling or bleeding. If you see any bleeding, lie down and apply pressure over the area with a clean town or washcloth. Notify your doctor for any redness, swelling, drainage or oozing from the puncture site. Notify your doctor for any fever or chills. *If the leg or arm with the puncture becomes cold, numb or painful, call Dr Cheikh Garcia. *Activity should be limited for the next 48 hours. Climb stairs as little as possible and avoid any stooping, bending or strenuous activity for 48 hours. No heavy lifting (anything over 10 pounds) for three days. *Do not drive for 48 hours. *You may resume your usual diet. Drink more fluids than usual.    *Have a responsible person drive you home and stay with you for at least 24 hours after your heart catheterization/angiography. *You may remove the bandage from your Right and Arm in 24 hours. You may shower in 24 hours. No tub baths, hot tubs or swimming for one week. Do not place any lotions, creams, powders, ointments over the puncture site for one week. You may place a clean band-aid over the puncture site each day for 5 days. Change this daily. DISCHARGE SUMMARY from Nurse    The following personal items are in your possession at time of discharge:    Dental Appliances: None  Visual Aid: None     Home Medications: None  Jewelry: Watch  Clothing: At bedside  Other Valuables: Cell Phone, Personal electronic devices (comment) (ipad)             PATIENT INSTRUCTIONS:    After general anesthesia or intravenous sedation, for 24 hours or while taking prescription Narcotics:  · Limit your activities  · Do not drive and operate hazardous machinery  · Do not make important personal or business decisions  · Do  not drink alcoholic beverages  · If you have not urinated within 8 hours after discharge, please contact your surgeon on call.     Report the following to your surgeon:  · Excessive pain, swelling, redness or odor of or around the surgical area  · Temperature over 100.5  · Nausea and vomiting lasting longer than 4 hours or if unable to take medications  · Any signs of decreased circulation or nerve impairment to extremity: change in color, persistent  numbness, tingling, coldness or increase pain  · Any questions        What to do at Home:  Recommended activity: Activity as tolerated and No lifting, Driving, or Strenuous exercise for 2 days. *  Please give a list of your current medications to your Primary Care Provider. *  Please update this list whenever your medications are discontinued, doses are      changed, or new medications (including over-the-counter products) are added. *  Please carry medication information at all times in case of emergency situations. These are general instructions for a healthy lifestyle:    No smoking/ No tobacco products/ Avoid exposure to second hand smoke    Surgeon General's Warning:  Quitting smoking now greatly reduces serious risk to your health. Obesity, smoking, and sedentary lifestyle greatly increases your risk for illness    A healthy diet, regular physical exercise & weight monitoring are important for maintaining a healthy lifestyle    You may be retaining fluid if you have a history of heart failure or if you experience any of the following symptoms:  Weight gain of 3 pounds or more overnight or 5 pounds in a week, increased swelling in our hands or feet or shortness of breath while lying flat in bed. Please call your doctor as soon as you notice any of these symptoms; do not wait until your next office visit.     Recognize signs and symptoms of STROKE:    F-face looks uneven    A-arms unable to move or move unevenly    S-speech slurred or non-existent    T-time-call 911 as soon as signs and symptoms begin-DO NOT go       Back to bed or wait to see if you get better-TIME IS BRAIN. Warning Signs of HEART ATTACK     Call 911 if you have these symptoms:   Chest discomfort. Most heart attacks involve discomfort in the center of the chest that lasts more than a few minutes, or that goes away and comes back. It can feel like uncomfortable pressure, squeezing, fullness, or pain.  Discomfort in other areas of the upper body. Symptoms can include pain or discomfort in one or both arms, the back, neck, jaw, or stomach.  Shortness of breath with or without chest discomfort.  Other signs may include breaking out in a cold sweat, nausea, or lightheadedness. Don't wait more than five minutes to call 911 - MINUTES MATTER! Fast action can save your life. Calling 911 is almost always the fastest way to get lifesaving treatment. Emergency Medical Services staff can begin treatment when they arrive -- up to an hour sooner than if someone gets to the hospital by car. The discharge information has been reviewed with the patient. The patient verbalized understanding. Discharge medications reviewed with the patient and appropriate educational materials and side effects teaching were provided.

## 2017-07-14 NOTE — CARDIO/PULMONARY
Cardiac rehab in to see patient. He was in bed stated that he was ready to go home. We discussed relaxation, exercise and nutrition. Patient stated that he does puzzles and sits to relax. Encourged patient to stop during his day and take deep breaths or be alone for a couple minutes to refocus. We then talked about exercise and patient stated that he goes to a gym several times per week. Encouraged patient to take it slowly to get back to what he was doing. We then discussed the outpatient rehab program and he stated that he would think about participating in that to get off to a good start with his exercise. We then discussed his diet. He stated that he eats out a lot because of his job. Encouraged patient to look at the labels or nutrition facts of foods that he chooses so that he is picking the healthiest options available. Patient stated that he appreciated the visit and the information that was given. Will continue to follow throughout his hospital stay.

## 2017-07-14 NOTE — PROGRESS NOTES
Care Management Interventions  Mode of Transport at Discharge:  (wife)  Discharge Durable Medical Equipment: No  Physical Therapy Consult: No  Occupational Therapy Consult: No  Speech Therapy Consult: No  Current Support Network: Lives with Spouse  Confirm Follow Up Transport: Family  Discharge Location  Discharge Placement: Home with family assistance     Pt for dc today after Observation stay. Gave pt Observation info with copy to chart. Pt is AAOx3 ,independent for ambulation & ADLs. Will return home with his wife today. TUYET Rice unit secretary & she states his free fill of Carin Hennessy has already been sent up for pt to take home.

## 2017-07-14 NOTE — ROUTINE PROCESS
I have reviewed discharge instructions and Rx with the patient. The patient verbalized understanding. Pt belongings including ipad and cell phone collected by pt. Pt ambulated to private vehicle with RN escort.

## 2017-07-20 ENCOUNTER — TELEPHONE (OUTPATIENT)
Dept: CARDIAC REHAB | Age: 67
End: 2017-07-20

## 2017-07-20 NOTE — TELEPHONE ENCOUNTER
Cardiac Rehab called patient and left a message about the program. Will follow up.     Thank you,  Morris Arenas

## 2017-08-18 ENCOUNTER — OFFICE VISIT (OUTPATIENT)
Dept: CARDIOLOGY CLINIC | Age: 67
End: 2017-08-18

## 2017-08-18 VITALS
WEIGHT: 182 LBS | SYSTOLIC BLOOD PRESSURE: 128 MMHG | HEART RATE: 68 BPM | HEIGHT: 70 IN | OXYGEN SATURATION: 96 % | BODY MASS INDEX: 26.05 KG/M2 | DIASTOLIC BLOOD PRESSURE: 82 MMHG

## 2017-08-18 DIAGNOSIS — I25.10 CORONARY ARTERY DISEASE INVOLVING NATIVE CORONARY ARTERY OF NATIVE HEART WITHOUT ANGINA PECTORIS: Primary | ICD-10-CM

## 2017-08-18 NOTE — PROGRESS NOTES
1. Have you been to the ER, urgent care clinic since your last visit? Hospitalized since your last visit? S/p stenting of proximal RCA 95% lesion and super dominant vessel, and 99% proximal/mid LAD lesion to 0% 7/13/17  2. Have you seen or consulted any other health care providers outside of the [address] [place] [address] since your last visit? Include any pap smears or colon screening.  no

## 2017-08-18 NOTE — MR AVS SNAPSHOT
Visit Information Date & Time Provider Department Dept. Phone Encounter #  
 8/18/2017  3:00 PM Seymour Kawasaki, MD [facility] [address] [id number] Upcoming Health Maintenance Date Due Hepatitis C Screening 1950 DTaP/Tdap/Td series (1 - Tdap) 4/13/1973 FOBT Q 1 YEAR AGE 50-75 4/13/2002 ZOSTER VACCINE AGE 60> 2/13/2012 GLAUCOMA SCREENING Q2Y 4/13/2017 Pneumococcal 65+ Low/Medium Risk (1 of 2 - PCV13) 4/13/2017 MEDICARE YEARLY EXAM 4/13/2017 INFLUENZA AGE 9 TO ADULT 8/1/2017 Allergies as of 8/18/2017  Review Complete On: 8/18/2017 By: Britney Helm No Known Allergies Current Immunizations  Never Reviewed No immunizations on file. Not reviewed this visit You Were Diagnosed With   
  
 Codes Comments Coronary artery disease involving native coronary artery of native heart without angina pectoris    -  Primary ICD-10-CM: I25.10 ICD-9-CM: 414.01 Vitals BP Pulse Height(growth percentile) Weight(growth percentile) SpO2 BMI  
 128/82 68 5' 10\" (1.778 m) 182 lb (82.6 kg) 96% 26.11 kg/m2 Smoking Status Former Smoker Vitals History BMI and BSA Data Body Mass Index Body Surface Area  
 26.11 kg/m 2 2.02 m 2 Your Updated Medication List  
  
   
This list is accurate as of: 8/18/17  3:42 PM.  Always use your most recent med list.  
  
  
  
  
 aspirin delayed-release 81 mg tablet Take 81 mg by mouth daily. metoprolol succinate 25 mg XL tablet Commonly known as:  TOPROL-XL Take 0.5 Tabs by mouth daily. nitroglycerin 400 mcg/spray spray Commonly known as:  NITROLINGUAL  
1 [place] by SubLINGual route every five (5) minutes as needed. rosuvastatin 10 mg tablet Commonly known as:  CRESTOR  
take 1 tablet by mouth once daily * ticagrelor 90 mg tablet Commonly known as:  [employer] Take 1 Tab by mouth every twelve (12) hours every twelve (12) hours. * ticagrelor 90 mg tablet Commonly known as:  [employer] Take 1 Tab by mouth two (2) times a day. * Notice: This list has 2 medication(s) that are the same as other medications prescribed for you. Read the directions carefully, and ask your doctor or other care provider to review them with you. We Performed the Following AMB POC EKG ROUTINE W/ 12 LEADS, INTER & REP [38195 CPT(R)] Introducing [facility] SERVICES! Praveena Flores introduces [facility] patient portal. Now you can access parts of your medical record, email your doctor's office, and request medication refills online. 1. In your internet browser, go to [email or web address] 2. Click on the First Time User? Click Here link in the Sign In box. You will see the New Member Sign Up page. 3. Enter your [facility] Access Code exactly as it appears below. You will not need to use this code after youve completed the sign-up process. If you do not sign up before the expiration date, you must request a new code. · [facility] Access Code: [email or web address] Expires: 11/11/2017  3:07 PM 
 
4. Enter the last four digits of your Social Security Number (xxxx) and Date of Birth (mm/dd/yyyy) as indicated and click Submit. You will be taken to the next sign-up page. 5. Create a [facility] ID. This will be your [facility] login ID and cannot be changed, so think of one that is secure and easy to remember. 6. Create a [facility] password. You can change your password at any time. 7. Enter your Password Reset Question and Answer. This can be used at a later time if you forget your password. 8. Enter your e-mail address. You will receive e-mail notification when new information is available in [address] 9. Click Sign Up. You can now view and download portions of your medical record.  
10. Click the Download Summary menu link to download a portable copy of your medical information. If you have questions, please visit the Frequently Asked Questions section of the [employer] website. Remember, [employer] is NOT to be used for urgent needs. For medical emergencies, dial 911. Now available from your iPhone and Android! Please provide this summary of care documentation to your next provider. Your primary care clinician is listed as Grace Cavazos. If you have any questions after today's visit, please call [phone].

## 2017-08-23 NOTE — PROGRESS NOTES
PATIENT NAME: Sahra Miner         74 y.o.      1950              DATE:8/18/2017    REASON FOR VISIT: Coronary artery disease    HISTORY OF PRESENT ILLNESS: Status post PTCA and stenting of right coronary artery and LAD lesions. Denies chest pain and other symptoms suggestive of angina. Denies SANCHEZ, PND, orthopnea. Denies palpitation, syncope, presyncope. Denies edema in the lower extremities. Presently taking Crestor for the control of hyperlipidemia. He has questions about diet. I have reviewed these with him. His primary care physician will be obtaining a lipid profile in the near future. Target values discussed. No problems with the Brilinta. No problems with bleeding. Importance of Brilinta therapy discussed. PAST MEDICAL HISTORY:   Past Medical History:  05/17/2017: Cardiac treadmill stress test      Comment: At least intermediate risk. Positive EKG on                maximal EST. Chest pain resolved within 1 min                of recovery. ST changes persisted 5 mins of                recovery. Ex time 4 min 38 sec. No date: Mixed hyperlipidemia    PAST SURGICAL HISTORY:   Past Surgical History:  7/13/2017: CARDIAC CATHETERIZATION      Comment:    7/13/2017: CORONARY ARTERY ANGIOGRAM      Comment:    7/13/2017: CORONARY STENT EA ADDL VESSEL      Comment:    7/13/2017: CORONARY STENT SINGLE W/PTCA      Comment:    No date: HX TONSILLECTOMY  7/13/2017: LV ANGIOGRAPHY      Comment:    7/13/2017: MODERATE SEDATION      Comment:        SOCIAL HISTORY:  Social History    Marital status:              Spouse name:                       Years of education:                 Number of children:               Social History Main Topics    Smoking status: Former Smoker                                                                Packs/day: 1.50      Years: 40.00          Quit date: 1/7/2017    Smokeless status: Never Used                        Alcohol use:  Yes ALLERGIES:   No Known Allergies     CURRENT MEDICATIONS:   Current Outpatient Prescriptions:  metoprolol succinate (TOPROL-XL) 25 mg XL tablet, Take 0.5 Tabs by mouth daily. nitroglycerin (NITROLINGUAL) 400 mcg/spray spray, 1 [place] by SubLINGual route every five (5) minutes as needed. ticagrelor (BRILINTA) 90 mg tablet, Take 1 Tab by mouth every twelve (12) hours every twelve (12) hours. ticagrelor (BRILINTA) 90 mg tablet, Take 1 Tab by mouth two (2) times a day. aspirin delayed-release 81 mg tablet, Take 81 mg by mouth daily. rosuvastatin (CRESTOR) 10 mg tablet, take 1 tablet by mouth once daily    No current facility-administered medications for this visit. REVIEW of SYSTEMS:History obtained from chart review and the patient  General ROS: negative for - weight gain or weight loss  Hematological and Lymphatic ROS: negative for - bleeding problems  Respiratory ROS: no cough, shortness of breath, or wheezing  Cardiovascular ROS: See history of present illness     PHYSICAL EXAMINATION:   /82  Pulse 68  Ht 5' 10\" (1.778 m)  Wt 82.6 kg (182 lb)  SpO2 96%  BMI 26.11 kg/m2  BP Readings from Last 3 Encounters:  08/18/17 : 128/82  07/14/17 : 136/75  07/07/17 : 122/82    Pulse Readings from Last 3 Encounters:  08/18/17 : 68  07/14/17 : (!) 53  07/07/17 : 62    Wt Readings from Last 3 Encounters:  08/18/17 : 82.6 kg (182 lb)  07/14/17 : 84.8 kg (186 lb 14.4 oz)  07/07/17 : 79.8 kg (176 lb)    General: Well-developed white male no apparent distress. HEENT: Sclera clear. Mucous membranes pink and moist.  Neck: No jugular venous distention. Carotid upstrokes 2+ without bruits. Chest: Clear to  auscultation. Heart: PMI not palpable. Regular rhythm. No murmur or gallop. Abdomen: Nontender without masses or organomegaly. Extremities: No edema. Skin: Warm and dry. No stasis changes. Neuro: Alert, oriented, speech WNL, no facial asymmetry. Gait WNL. EKG:  Within normal limits    IMPRESSION: Coronary artery disease, asymptomatic status post PTCA and stenting of LAD and right coronary artery lesions  Hyperlipidemia  Uncomplicated antiplatelet therapy    PLAN:  Medications were reviewed. The importance of each of these discussed with the patient. Lipid therapy discussed. Goals discussed. Activity discussed. Follow-up with PCP. Return in 6 months or as needed    The diagnoses and plan were discussed with patient. All questions answered. Plan of care agreed to by all concerned. Herberth Gutierrez.  MD [place]       ,

## 2017-09-14 ENCOUNTER — TELEPHONE (OUTPATIENT)
Dept: CARDIAC REHAB | Age: 67
End: 2017-09-14

## 2017-09-14 NOTE — TELEPHONE ENCOUNTER
[facility] called patient and spoke to him very briefly about the program. He is not interested and declined services.     Thank you,  Esthela Leon

## 2017-10-17 RX ORDER — METOPROLOL SUCCINATE 25 MG/1
12.5 TABLET, EXTENDED RELEASE ORAL DAILY
Qty: 30 TAB | Refills: 6 | Status: SHIPPED | OUTPATIENT
Start: 2017-10-17 | End: 2017-12-09

## 2017-12-08 ENCOUNTER — HOSPITAL ENCOUNTER (INPATIENT)
Age: 67
LOS: 1 days | Discharge: HOME | DRG: 287 | End: 2017-12-09
Attending: EMERGENCY MEDICINE | Admitting: HOSPITALIST
Payer: COMMERCIAL

## 2017-12-08 ENCOUNTER — APPOINTMENT (OUTPATIENT)
Dept: CT IMAGING | Age: 67
DRG: 287 | End: 2017-12-08
Attending: PHYSICIAN ASSISTANT
Payer: COMMERCIAL

## 2017-12-08 PROBLEM — I21.3 STEMI (ST ELEVATION MYOCARDIAL INFARCTION) (HCC): Status: ACTIVE | Noted: 2017-12-08

## 2017-12-08 LAB
ACT BLD: 164 SECS (ref 79–138)
ALBUMIN SERPL-MCNC: 3.1 G/DL (ref 3.4–5)
ALBUMIN/GLOB SERPL: 1.2 {RATIO} (ref 0.8–1.7)
ALP SERPL-CCNC: 46 U/L (ref 45–117)
ALT SERPL-CCNC: 40 U/L (ref 16–61)
ANION GAP SERPL CALC-SCNC: 6 MMOL/L (ref 3–18)
APTT PPP: 25.6 SEC (ref 23–36.4)
AST SERPL-CCNC: 44 U/L (ref 15–37)
BASOPHILS # BLD: 0 K/UL (ref 0–0.1)
BASOPHILS NFR BLD: 0 % (ref 0–2)
BILIRUB SERPL-MCNC: 0.6 MG/DL (ref 0.2–1)
BUN SERPL-MCNC: 13 MG/DL (ref 7–18)
BUN/CREAT SERPL: 15 (ref 12–20)
CA-I SERPL-SCNC: 1.08 MMOL/L (ref 1.12–1.32)
CALCIUM SERPL-MCNC: 7.1 MG/DL (ref 8.5–10.1)
CHLORIDE SERPL-SCNC: 112 MMOL/L (ref 100–108)
CK MB CFR SERPL CALC: 3.6 % (ref 0–4)
CK MB CFR SERPL CALC: 4.4 % (ref 0–4)
CK MB CFR SERPL CALC: 5.6 % (ref 0–4)
CK MB SERPL-MCNC: 36.1 NG/ML (ref 5–25)
CK MB SERPL-MCNC: 55.9 NG/ML (ref 5–25)
CK MB SERPL-MCNC: 67 NG/ML (ref 5–25)
CK SERPL-CCNC: 1188 U/L (ref 39–308)
CK SERPL-CCNC: 1258 U/L (ref 39–308)
CK SERPL-CCNC: 990 U/L (ref 39–308)
CO2 SERPL-SCNC: 24 MMOL/L (ref 21–32)
CREAT SERPL-MCNC: 0.88 MG/DL (ref 0.6–1.3)
DIFFERENTIAL METHOD BLD: ABNORMAL
EOSINOPHIL # BLD: 0.1 K/UL (ref 0–0.4)
EOSINOPHIL NFR BLD: 1 % (ref 0–5)
ERYTHROCYTE [DISTWIDTH] IN BLOOD BY AUTOMATED COUNT: 12.1 % (ref 11.6–14.5)
ERYTHROCYTE [DISTWIDTH] IN BLOOD BY AUTOMATED COUNT: 12.3 % (ref 11.6–14.5)
GLOBULIN SER CALC-MCNC: 2.6 G/DL (ref 2–4)
GLUCOSE BLD STRIP.AUTO-MCNC: 204 MG/DL (ref 70–110)
GLUCOSE SERPL-MCNC: 98 MG/DL (ref 74–99)
HCT VFR BLD AUTO: 39.9 % (ref 36–48)
HCT VFR BLD AUTO: 41.1 % (ref 36–48)
HGB BLD-MCNC: 14.1 G/DL (ref 13–16)
HGB BLD-MCNC: 14.2 G/DL (ref 13–16)
INR PPP: 1.1 (ref 0.8–1.2)
LYMPHOCYTES # BLD: 1.1 K/UL (ref 0.9–3.6)
LYMPHOCYTES NFR BLD: 14 % (ref 21–52)
MAGNESIUM SERPL-MCNC: 2.1 MG/DL (ref 1.6–2.6)
MCH RBC QN AUTO: 32.9 PG (ref 24–34)
MCH RBC QN AUTO: 33.7 PG (ref 24–34)
MCHC RBC AUTO-ENTMCNC: 34.3 G/DL (ref 31–37)
MCHC RBC AUTO-ENTMCNC: 35.6 G/DL (ref 31–37)
MCV RBC AUTO: 94.8 FL (ref 74–97)
MCV RBC AUTO: 95.8 FL (ref 74–97)
MONOCYTES # BLD: 0.6 K/UL (ref 0.05–1.2)
MONOCYTES NFR BLD: 7 % (ref 3–10)
NEUTS SEG # BLD: 6.3 K/UL (ref 1.8–8)
NEUTS SEG NFR BLD: 78 % (ref 40–73)
PHOSPHATE SERPL-MCNC: 3.2 MG/DL (ref 2.5–4.9)
PLATELET # BLD AUTO: 156 K/UL (ref 135–420)
PLATELET # BLD AUTO: 166 K/UL (ref 135–420)
PMV BLD AUTO: 10.7 FL (ref 9.2–11.8)
PMV BLD AUTO: 10.8 FL (ref 9.2–11.8)
POTASSIUM SERPL-SCNC: 4 MMOL/L (ref 3.5–5.5)
PROT SERPL-MCNC: 5.7 G/DL (ref 6.4–8.2)
PROTHROMBIN TIME: 14.1 SEC (ref 11.5–15.2)
RBC # BLD AUTO: 4.21 M/UL (ref 4.7–5.5)
RBC # BLD AUTO: 4.29 M/UL (ref 4.7–5.5)
SODIUM SERPL-SCNC: 142 MMOL/L (ref 136–145)
T4 FREE SERPL-MCNC: 1 NG/DL (ref 0.7–1.5)
TROPONIN I SERPL-MCNC: 0.17 NG/ML (ref 0–0.04)
TROPONIN I SERPL-MCNC: 0.29 NG/ML (ref 0–0.04)
TROPONIN I SERPL-MCNC: 0.61 NG/ML (ref 0–0.04)
TSH SERPL DL<=0.05 MIU/L-ACNC: 1.17 UIU/ML (ref 0.36–3.74)
WBC # BLD AUTO: 17.1 K/UL (ref 4.6–13.2)
WBC # BLD AUTO: 8.1 K/UL (ref 4.6–13.2)

## 2017-12-08 PROCEDURE — 82330 ASSAY OF CALCIUM: CPT | Performed by: PHYSICIAN ASSISTANT

## 2017-12-08 PROCEDURE — 93306 TTE W/DOPPLER COMPLETE: CPT

## 2017-12-08 PROCEDURE — 74011250637 HC RX REV CODE- 250/637: Performed by: PHYSICIAN ASSISTANT

## 2017-12-08 PROCEDURE — 70450 CT HEAD/BRAIN W/O DYE: CPT

## 2017-12-08 PROCEDURE — 85610 PROTHROMBIN TIME: CPT | Performed by: PHYSICIAN ASSISTANT

## 2017-12-08 PROCEDURE — 74011000258 HC RX REV CODE- 258: Performed by: PHYSICIAN ASSISTANT

## 2017-12-08 PROCEDURE — 82962 GLUCOSE BLOOD TEST: CPT

## 2017-12-08 PROCEDURE — 65660000000 HC RM CCU STEPDOWN

## 2017-12-08 PROCEDURE — 84100 ASSAY OF PHOSPHORUS: CPT | Performed by: PHYSICIAN ASSISTANT

## 2017-12-08 PROCEDURE — 36415 COLL VENOUS BLD VENIPUNCTURE: CPT | Performed by: PHYSICIAN ASSISTANT

## 2017-12-08 PROCEDURE — 80053 COMPREHEN METABOLIC PANEL: CPT | Performed by: PHYSICIAN ASSISTANT

## 2017-12-08 PROCEDURE — 77030018729 CARDIAC CATHETERIZATION

## 2017-12-08 PROCEDURE — 85025 COMPLETE CBC W/AUTO DIFF WBC: CPT | Performed by: PHYSICIAN ASSISTANT

## 2017-12-08 PROCEDURE — 74011250636 HC RX REV CODE- 250/636: Performed by: PHYSICIAN ASSISTANT

## 2017-12-08 PROCEDURE — 83735 ASSAY OF MAGNESIUM: CPT | Performed by: PHYSICIAN ASSISTANT

## 2017-12-08 PROCEDURE — 85347 COAGULATION TIME ACTIVATED: CPT

## 2017-12-08 PROCEDURE — 85730 THROMBOPLASTIN TIME PARTIAL: CPT | Performed by: PHYSICIAN ASSISTANT

## 2017-12-08 PROCEDURE — 75810000275 HC EMERGENCY DEPT VISIT NO LEVEL OF CARE

## 2017-12-08 PROCEDURE — 74011636320 HC RX REV CODE- 636/320: Performed by: INTERNAL MEDICINE

## 2017-12-08 PROCEDURE — 84439 ASSAY OF FREE THYROXINE: CPT | Performed by: PHYSICIAN ASSISTANT

## 2017-12-08 PROCEDURE — 93005 ELECTROCARDIOGRAM TRACING: CPT

## 2017-12-08 PROCEDURE — 85027 COMPLETE CBC AUTOMATED: CPT | Performed by: INTERNAL MEDICINE

## 2017-12-08 PROCEDURE — 77030011256 HC DRSG MEPILEX <16IN NO BORD MOLN -A

## 2017-12-08 PROCEDURE — 82550 ASSAY OF CK (CPK): CPT | Performed by: PHYSICIAN ASSISTANT

## 2017-12-08 PROCEDURE — 84443 ASSAY THYROID STIM HORMONE: CPT | Performed by: PHYSICIAN ASSISTANT

## 2017-12-08 PROCEDURE — 74011250636 HC RX REV CODE- 250/636: Performed by: INTERNAL MEDICINE

## 2017-12-08 RX ORDER — MIDAZOLAM HYDROCHLORIDE 1 MG/ML
1-4 INJECTION, SOLUTION INTRAMUSCULAR; INTRAVENOUS
Status: DISCONTINUED | OUTPATIENT
Start: 2017-12-08 | End: 2017-12-08

## 2017-12-08 RX ORDER — HEPARIN SODIUM 200 [USP'U]/100ML
1000 INJECTION, SOLUTION INTRAVENOUS ONCE
Status: COMPLETED | OUTPATIENT
Start: 2017-12-08 | End: 2017-12-08

## 2017-12-08 RX ORDER — FAMOTIDINE 20 MG/1
20 TABLET, FILM COATED ORAL 2 TIMES DAILY
Status: DISCONTINUED | OUTPATIENT
Start: 2017-12-08 | End: 2017-12-08

## 2017-12-08 RX ORDER — SODIUM CHLORIDE 9 MG/ML
75 INJECTION, SOLUTION INTRAVENOUS CONTINUOUS
Status: DISCONTINUED | OUTPATIENT
Start: 2017-12-08 | End: 2017-12-08

## 2017-12-08 RX ORDER — NOREPINEPHRINE BITARTRATE/D5W 8 MG/250ML
2-16 PLASTIC BAG, INJECTION (ML) INTRAVENOUS
Status: DISCONTINUED | OUTPATIENT
Start: 2017-12-08 | End: 2017-12-08

## 2017-12-08 RX ORDER — GUAIFENESIN 100 MG/5ML
81 LIQUID (ML) ORAL DAILY
Status: DISCONTINUED | OUTPATIENT
Start: 2017-12-08 | End: 2017-12-09 | Stop reason: HOSPADM

## 2017-12-08 RX ORDER — SODIUM CHLORIDE 0.9 % (FLUSH) 0.9 %
5-10 SYRINGE (ML) INJECTION EVERY 8 HOURS
Status: DISCONTINUED | OUTPATIENT
Start: 2017-12-08 | End: 2017-12-09 | Stop reason: HOSPADM

## 2017-12-08 RX ORDER — ACETAMINOPHEN 325 MG/1
650 TABLET ORAL
Status: DISCONTINUED | OUTPATIENT
Start: 2017-12-08 | End: 2017-12-09 | Stop reason: HOSPADM

## 2017-12-08 RX ORDER — HEPARIN SODIUM 1000 [USP'U]/ML
1000-10000 INJECTION, SOLUTION INTRAVENOUS; SUBCUTANEOUS
Status: DISCONTINUED | OUTPATIENT
Start: 2017-12-08 | End: 2017-12-08

## 2017-12-08 RX ORDER — ADHESIVE BANDAGE
30 BANDAGE TOPICAL
Status: DISCONTINUED | OUTPATIENT
Start: 2017-12-08 | End: 2017-12-09 | Stop reason: HOSPADM

## 2017-12-08 RX ORDER — FENTANYL CITRATE 50 UG/ML
25-100 INJECTION, SOLUTION INTRAMUSCULAR; INTRAVENOUS
Status: DISCONTINUED | OUTPATIENT
Start: 2017-12-08 | End: 2017-12-08

## 2017-12-08 RX ORDER — LIDOCAINE HYDROCHLORIDE 10 MG/ML
1-30 INJECTION, SOLUTION EPIDURAL; INFILTRATION; INTRACAUDAL; PERINEURAL
Status: DISCONTINUED | OUTPATIENT
Start: 2017-12-08 | End: 2017-12-08

## 2017-12-08 RX ORDER — ROSUVASTATIN CALCIUM 5 MG/1
10 TABLET, COATED ORAL
Status: DISCONTINUED | OUTPATIENT
Start: 2017-12-08 | End: 2017-12-09 | Stop reason: HOSPADM

## 2017-12-08 RX ORDER — TEMAZEPAM 15 MG/1
15 CAPSULE ORAL
Status: DISCONTINUED | OUTPATIENT
Start: 2017-12-08 | End: 2017-12-09 | Stop reason: HOSPADM

## 2017-12-08 RX ORDER — SODIUM CHLORIDE 0.9 % (FLUSH) 0.9 %
5-10 SYRINGE (ML) INJECTION AS NEEDED
Status: DISCONTINUED | OUTPATIENT
Start: 2017-12-08 | End: 2017-12-09 | Stop reason: HOSPADM

## 2017-12-08 RX ADMIN — IOPAMIDOL 60 ML: 612 INJECTION, SOLUTION INTRAVENOUS at 00:48

## 2017-12-08 RX ADMIN — TICAGRELOR 90 MG: 90 TABLET ORAL at 22:04

## 2017-12-08 RX ADMIN — FENTANYL CITRATE 25 MCG: 50 INJECTION INTRAMUSCULAR; INTRAVENOUS at 00:35

## 2017-12-08 RX ADMIN — Medication 10 ML: at 01:19

## 2017-12-08 RX ADMIN — Medication 10 ML: at 06:00

## 2017-12-08 RX ADMIN — ASPIRIN 81 MG 81 MG: 81 TABLET ORAL at 11:12

## 2017-12-08 RX ADMIN — Medication 10 ML: at 14:00

## 2017-12-08 RX ADMIN — FAMOTIDINE 20 MG: 20 TABLET, FILM COATED ORAL at 08:51

## 2017-12-08 RX ADMIN — HEPARIN SODIUM IN SODIUM CHLORIDE 1000 UNITS: 200 INJECTION INTRAVENOUS at 00:30

## 2017-12-08 RX ADMIN — SODIUM CHLORIDE 75 ML/HR: 900 INJECTION, SOLUTION INTRAVENOUS at 01:17

## 2017-12-08 RX ADMIN — ROSUVASTATIN CALCIUM 10 MG: 5 TABLET, FILM COATED ORAL at 22:05

## 2017-12-08 RX ADMIN — MIDAZOLAM HYDROCHLORIDE 1 MG: 1 INJECTION, SOLUTION INTRAMUSCULAR; INTRAVENOUS at 00:35

## 2017-12-08 RX ADMIN — CALCIUM GLUCONATE 1 G: 94 INJECTION, SOLUTION INTRAVENOUS at 09:59

## 2017-12-08 RX ADMIN — Medication 10 ML: at 22:00

## 2017-12-08 RX ADMIN — TICAGRELOR 90 MG: 90 TABLET ORAL at 11:12

## 2017-12-08 NOTE — PROGRESS NOTES
[facility]  Progress Note    Patient: Aditya Sleeper Age: 74 y.o. : 1950 MR#: [id number] SSN: [id number]  Date/Time: 2017     Subjective:     Review of systems  General: No fevers or chills. Cardiovascular: No chest pain or pressure. No palpitations. Pulmonary: No shortness of breath, cough or wheeze. Gastrointestinal: No abdominal pain, nausea, vomiting or diarrhea. Genitourinary: No urinary frequency, urgency, hesitancy or dysuria. Musculoskeletal: No joint or muscle pain, no back pain, no recent trauma. Neurologic: No headache, numbness, tingling or weakness. Assessment/Plan:   1. Syncope   2 Head injury - close   3 CAD   4 HLD   5 Tobacco abuse   6 H/o ETOH use     PLAN   - S/p Cath - no acute pathology   - Continue to monitor on TELE for another 24 hr for any arrhythmia   - Monitor Lytes   - Continue other home meds       Case discussed with:  [x]Patient  []Family  []Nursing  []Case Management  DVT Prophylaxis:  []Lovenox  []Hep SQ  []SCDs  []Coumadin   []On Heparin gtt    Patient Active Problem List   Diagnosis Code    CAD (coronary artery disease) I25.10    Unstable angina ([facility] [address] I20.0    [facility] I21.3         Objective:   VS:   Visit Vitals    /58    Pulse 70    Temp 98.1 °F (36.7 °C)    Resp 19    Ht 5' 10\" (1.778 m)    Wt 85.7 kg (188 lb 15 oz)    SpO2 99%    BMI 27.11 kg/m2      Tmax/24hrs: Temp (24hrs), Av.9 °F (36.6 °C), Min:97.7 °F (36.5 °C), Max:98.1 °F (36.7 °C)  IOBRIEF  Intake/Output Summary (Last 24 hours) at 17 1345  Last data filed at 17 1000   Gross per 24 hour   Intake           806.64 ml   Output                0 ml   Net           806.64 ml       General:  Alert, cooperative, no acute distress   HEENT:  NC, multiple bruises over left side of face   . PERRLA, anicteric sclerae. Pulmonary:  Bilateral air entry present .  No Wheezing/Rhonchi/Rales. Cardiovascular: Regular rate and Rhythm. GI:  Soft, Non distended, Non tender. + Bowel sounds. Extremities:  No edema, cyanosis, clubbing. No calf tenderness. Psych:  Not anxious or agitated. Neurologic: Grossly - Motor and Sensory functions are intact .       Medications:   Current Facility-Administered Medications   Medication Dose Route Frequency    sodium chloride (NS) flush 5-10 mL  5-10 mL IntraVENous Q8H    sodium chloride (NS) flush 5-10 mL  5-10 mL IntraVENous PRN    acetaminophen (TYLENOL) tablet 650 mg  650 mg Oral Q4H PRN    magnesium hydroxide (MILK OF MAGNESIA) 400 mg/5 mL oral suspension 30 mL  30 mL Oral QHS PRN    temazepam (RESTORIL) capsule 15 mg  15 mg Oral QHS PRN    influenza vaccine 2017-18 (3 yrs+)(PF) (FLUZONE QUAD/FLUARIX QUAD) injection 0.5 mL  0.5 mL IntraMUSCular PRIOR TO DISCHARGE    ticagrelor (BRILINTA) tablet 90 mg  90 mg Oral Q12H    aspirin chewable tablet 81 mg  81 mg Oral DAILY    rosuvastatin (CRESTOR) tablet 10 mg  10 mg Oral QHS       Labs:    Recent Results (from the past 24 hour(s))   CBC W/O DIFF    Collection Time: 12/08/17 12:37 AM   Result Value Ref Range    WBC 17.1 (H) 4.6 - 13.2 K/uL    RBC 4.21 (L) 4.70 - 5.50 M/uL    HGB 14.2 13.0 - 16.0 g/dL    HCT 39.9 36.0 - 48.0 %    MCV 94.8 74.0 - 97.0 FL    MCH 33.7 24.0 - 34.0 PG    MCHC 35.6 31.0 - 37.0 g/dL    RDW 12.1 11.6 - 14.5 %    PLATELET 899 150 - 143 K/uL    MPV 10.8 9.2 - 11.8 FL   POC ACTIVATED CLOTTING TIME    Collection Time: 12/08/17 12:40 AM   Result Value Ref Range    Activated Clotting Time (POC) 164 (H) 79 - 138 SECS   EKG, 12 LEAD, SUBSEQUENT    Collection Time: 12/08/17  1:28 AM   Result Value Ref Range    Ventricular Rate 69 BPM    Atrial Rate 69 BPM    P-R Interval 174 ms    QRS Duration 92 ms    Q-T Interval 406 ms    QTC Calculation (Bezet) 435 ms    Calculated P Axis 57 degrees    Calculated R Axis 51 degrees    Calculated T Axis 68 degrees    Diagnosis Normal sinus rhythm  Normal ECG  When compared with ECG of 14-JUL-2017 04:28,  No significant change was found     CBC WITH AUTOMATED DIFF    Collection Time: 12/08/17  5:01 AM   Result Value Ref Range    WBC 8.1 4.6 - 13.2 K/uL    RBC 4.29 (L) 4.70 - 5.50 M/uL    HGB 14.1 13.0 - 16.0 g/dL    HCT 41.1 36.0 - 48.0 %    MCV 95.8 74.0 - 97.0 FL    MCH 32.9 24.0 - 34.0 PG    MCHC 34.3 31.0 - 37.0 g/dL    RDW 12.3 11.6 - 14.5 %    PLATELET 917 885 - 979 K/uL    MPV 10.7 9.2 - 11.8 FL    NEUTROPHILS 78 (H) 40 - 73 %    LYMPHOCYTES 14 (L) 21 - 52 %    MONOCYTES 7 3 - 10 %    EOSINOPHILS 1 0 - 5 %    BASOPHILS 0 0 - 2 %    ABS. NEUTROPHILS 6.3 1.8 - 8.0 K/UL    ABS. LYMPHOCYTES 1.1 0.9 - 3.6 K/UL    ABS. MONOCYTES 0.6 0.05 - 1.2 K/UL    ABS. EOSINOPHILS 0.1 0.0 - 0.4 K/UL    ABS. BASOPHILS 0.0 0.0 - 0.1 K/UL    DF AUTOMATED     METABOLIC PANEL, COMPREHENSIVE    Collection Time: 12/08/17  5:01 AM   Result Value Ref Range    Sodium 142 136 - 145 mmol/L    Potassium 4.0 3.5 - 5.5 mmol/L    Chloride 112 (H) 100 - 108 mmol/L    CO2 24 21 - 32 mmol/L    Anion gap 6 3.0 - 18 mmol/L    Glucose 98 74 - 99 mg/dL    BUN 13 7.0 - 18 MG/DL    Creatinine 0.88 0.6 - 1.3 MG/DL    BUN/Creatinine ratio 15 12 - 20      GFR est AA >60 >60 ml/min/1.73m2    GFR est non-AA >60 >60 ml/min/1.73m2    Calcium 7.1 (L) 8.5 - 10.1 MG/DL    Bilirubin, total 0.6 0.2 - 1.0 MG/DL    ALT (SGPT) 40 16 - 61 U/L    AST (SGOT) 44 (H) 15 - 37 U/L    Alk.  phosphatase 46 45 - 117 U/L    Protein, total 5.7 (L) 6.4 - 8.2 g/dL    Albumin 3.1 (L) 3.4 - 5.0 g/dL    Globulin 2.6 2.0 - 4.0 g/dL    A-G Ratio 1.2 0.8 - 1.7     MAGNESIUM    Collection Time: 12/08/17  5:01 AM   Result Value Ref Range    Magnesium 2.1 1.6 - 2.6 mg/dL   PHOSPHORUS    Collection Time: 12/08/17  5:01 AM   Result Value Ref Range    Phosphorus 3.2 2.5 - 4.9 MG/DL   TSH 3RD GENERATION    Collection Time: 12/08/17  5:01 AM   Result Value Ref Range    TSH 1.17 0.36 - 3.74 uIU/mL   T4, FREE Collection Time: 12/08/17  5:01 AM   Result Value Ref Range    T4, Free 1.0 0.7 - 1.5 NG/DL   CARDIAC PANEL,(CK, CKMB & TROPONIN)    Collection Time: 12/08/17  5:01 AM   Result Value Ref Range    CK 1188 (H) 39 - 308 U/L    CK - MB 67.0 (H) <3.6 ng/ml    CK-MB Index 5.6 (H) 0.0 - 4.0 %    Troponin-I, Qt. 0.61 (H) 0.0 - 0.045 NG/ML   PROTHROMBIN TIME + INR    Collection Time: 12/08/17  5:01 AM   Result Value Ref Range    Prothrombin time 14.1 11.5 - 15.2 sec    INR 1.1 0.8 - 1.2     PTT    Collection Time: 12/08/17  5:01 AM   Result Value Ref Range    aPTT 25.6 23.0 - 36.4 SEC   CALCIUM, IONIZED    Collection Time: 12/08/17  5:01 AM   Result Value Ref Range    Ionized Calcium 1.08 (L) 1.12 - 1.32 MMOL/L   CARDIAC PANEL,(CK, CKMB & TROPONIN)    Collection Time: 12/08/17 10:28 AM   Result Value Ref Range    CK 1258 (H) 39 - 308 U/L    CK - MB 55.9 (H) <3.6 ng/ml    CK-MB Index 4.4 (H) 0.0 - 4.0 %    Troponin-I, Qt. 0.29 (H) 0.0 - 0.045 NG/ML       Signed By: Kathie Eubanks MD     December 8, 2017

## 2017-12-08 NOTE — CONSULTS
[facility]  Pulmonary, Critical Care, and Sleep Medicine      Name: Jonna Chapin MRN: [id number]   : 1950 Hospital: [address] [place] [address]   Date: 2017          Critical Care Initial Patient Consult    Requesting MD: Dr. Diane Johnson                                                   Reason for CC Consult: s/p cardiac cath; hypotension    IMPRESSION:   · Syncopal episode, initial suspicion for STEMI, s/p cardiac catheterization with findings of patent stents and no new lesion  · Brief hypotension, ?cardiogenic shock?, on very low dose levophed  · Left-sided facial edema and left auricular hematoma following fall from syncopal episode  · Hx CAD, s/p PTCA and stent to RCA and LAD (2017)  · Hyperlipidemia   · Active smoker, 1 pack every 2 days       RECOMMENDATIONS:   · CVS -recommend to restart brilinta, aspirin for recent stents. Cardiology consult (followed by [facility]). Monitor hemodynamics closely -actively titrate vasopressor to discontinue as able to. Agree with IVF hydration  · Resp -stable on room air  · ID - no suspicion for infectious process. Mild leukocytosis may be reactive/ inflammatory- no bands/ fever. Monitor temperature curve  · Heme/Onc- stable hgb/hct, plt  · Metabolic - monitor electrolytes, replace as needed per hospital protocol   · Renal - no wilks; trend renal function  · Endocrine - obtain TSH, FT4  · Neuro/ Pain/ Sedation -avoid sedative medications. Need to monitor left auricular hematoma  · GI - cardiac diet as ordered  · Prophylaxis - DVT, GI     Subjective/History: This patient has been seen and evaluated at the request of Dr. Diane Johnson following cardiac catheterization. Patient is a 74 y.o. male with hx of CAD s/p stent to RCA, LAD; active smoker, brought to the ED for syncopal episode. Per pt, he was at the heated garage working on household chore when he suddenly felt some hot flash and recalled calling out to his wife.  Wife found him on the ground and was brought to [facility] ED. Review of VS in ED showed pt to have had 1 episode of hypotension; no fever or oxygen desaturation. Initial w/u included CT head wo contrast which was negative for acute process; CTA chest/ abd/ pelvis which showed small hiatal herna, 5 mm nodule to RUL, antral wall thickening along posterior wall and high riding right testicle; CT cervical spine w/o contrast which shoed cervical spondylosis pronounced at C5-6 level; multiple EKGs which initially showed sinus tach and improving to NSR without acute ST elevation. Initial lab w/u significant for elevated TSH at 10.36; WBC 17; K 3.4. Pt was transferred from [facility] to [facility] for emergent cath which did not show any new lesions, and hence no interventions done. Pt was received in ICU on very low dose levophed at 3 mcg/min however was discontinued upon evaluation as pt's BP was stable with systolic in 821W. Pt states he has some chest tightness on midsternal area however is not painful and is non-radiating. Repeat EKG in unit showed NSR with Qtc 435, no ST elevation/ depression. Pt denies any sob, dyspnea, chest pain, abdominal pain, nausea, vomiting, dizziness, lightheadedness; fevers, colds, dysuria, diarrhea. Pt states he has been in his usual state of health without any symptoms until last night. Pt is a ; smokes about 1 pack every 2 days; consumes about 1 ounce of alcohol daily. Pt had undergone stents to the RCA, LAD in July 2017 and was placed on brilinta and crestor. Pt was recently seen by cardiologist, Dr. Cande Barker in Aug 2017 and continued on current regimen. Past Medical History:   Diagnosis Date    Cardiac treadmill stress test 05/17/2017    At least intermediate risk. Positive EKG on maximal EST. Chest pain resolved within 1 min of recovery. ST changes persisted 5 mins of recovery. Ex time 4 min 38 sec.       Mixed hyperlipidemia       Past Surgical History:   Procedure Laterality Date  CARDIAC CATHETERIZATION  7/13/2017         CORONARY ARTERY ANGIOGRAM  7/13/2017         CORONARY STENT EA ADDL VESSEL  7/13/2017         CORONARY STENT SINGLE W/PTCA  7/13/2017         HX TONSILLECTOMY      LV ANGIOGRAPHY  7/13/2017         MODERATE SEDATION  7/13/2017           Prior to Admission medications    Medication Sig Start Date End Date Taking? Authorizing Provider   metoprolol succinate (TOPROL-XL) 25 mg XL tablet Take 0.5 Tabs by mouth daily. 10/17/17   [facility], NP   ticagrelor (BRILINTA) 90 mg tablet Take 1 Tab by mouth every twelve (12) hours every twelve (12) hours. 10/17/17   [facility], NP   nitroglycerin (NITROLINGUAL) 400 mcg/spray spray 1 Spray by SubLINGual route every five (5) minutes as needed. 7/13/17   ABDI Sanchez   ticagrelor (BRILINTA) 90 mg tablet Take 1 Tab by mouth two (2) times a day. 7/13/17   ABDI Sanchez   aspirin delayed-release 81 mg tablet Take 81 mg by mouth daily.     Historical Provider   rosuvastatin (CRESTOR) 10 mg tablet take 1 tablet by mouth once daily 6/21/17   Historical Provider     Current Facility-Administered Medications   Medication Dose Route Frequency    NOREPINephrine (LEVOPHED) 8 mg in 5% dextrose 250mL infusion  2-16 mcg/min IntraVENous TITRATE    0.9% sodium chloride infusion  75 mL/hr IntraVENous CONTINUOUS    sodium chloride (NS) flush 5-10 mL  5-10 mL IntraVENous Q8H    influenza vaccine 2017-18 (3 yrs+)(PF) (FLUZONE QUAD/FLUARIX QUAD) injection 0.5 mL  0.5 mL IntraMUSCular PRIOR TO DISCHARGE     No Known Allergies   Social History   Substance Use Topics    Smoking status: Former Smoker     Packs/day: 1.50     Years: 40.00     Quit date: 1/7/2017    Smokeless tobacco: Never Used    Alcohol use Yes      Family History   Problem Relation Age of Onset    Heart Attack Father     Coronary Artery Disease Father         Review of Systems:  A comprehensive review of systems was negative except for that written in the HPI. Objective:   Vital Signs:    Visit Vitals    /66 (BP 1 Location: Right arm, BP Patient Position: At rest)    Pulse 72    Temp 97.7 °F (36.5 °C)    Resp 20    Ht 5' 10\" (1.778 m)    Wt 85.7 kg (188 lb 15 oz)    SpO2 100%    BMI 27.11 kg/m2       O2 Device: Room air       Temp (24hrs), Av.7 °F (36.5 °C), Min:97.7 °F (36.5 °C), Max:97.7 °F (36.5 °C)       Intake/Output:   Last shift:       1901 -  07  In: 96.6 [I.V.:96.6]  Out: -   Last 3 shifts:      Intake/Output Summary (Last 24 hours) at 17 0477  Last data filed at 17 0200   Gross per 24 hour   Intake            96.64 ml   Output                0 ml   Net            96.64 ml       Physical Exam:    General:  Alert, cooperative, no distress, appears stated age. Head:  + edema more pronounced on left side of face with abrasion to left zygomatic area. + left aurical hematoma   Eyes:  Conjunctivae/corneas clear. EOMs intact. Nose: Nares normal. No drainage    Throat: Lips, mucosa, and tongue normal.   Neck: Supple, symmetrical, trachea midline, no adenopathy, thyroid: no enlargment/tenderness/nodules, no carotid bruit and no JVD. Back:   Symmetric, no curvature. Lungs:   Clear to auscultation bilaterally. Chest wall:  No tenderness or deformity. Heart:  Regular rate and rhythm, S1, S2 normal   Abdomen:   Soft, non-tender. Bowel sounds normal.    Extremities: Extremities normal, atraumatic, no cyanosis or edema. + bandage to right groin without hematoma   Pulses: 2+ and symmetric all extremities.    Skin: Skin texture, turgor normal. + abrasion to left zygomatic area; + left auricular hematoma with bluish discoloration to upper left auricular area   Lymph nodes: Cervical, supraclavicular nodes normal.   Neurologic: Grossly nonfocal       Data:     Recent Results (from the past 24 hour(s))   CBC W/O DIFF    Collection Time: 17 12:37 AM   Result Value Ref Range    WBC 17.1 (H) 4.6 - 13.2 K/uL    RBC 4.21 (L) 4.70 - 5.50 M/uL    HGB 14.2 13.0 - 16.0 g/dL    HCT 39.9 36.0 - 48.0 %    MCV 94.8 74.0 - 97.0 FL    MCH 33.7 24.0 - 34.0 PG    MCHC 35.6 31.0 - 37.0 g/dL    RDW 12.1 11.6 - 14.5 %    PLATELET 579 640 - 979 K/uL    MPV 10.8 9.2 - 11.8 FL   POC ACTIVATED CLOTTING TIME    Collection Time: 12/08/17 12:40 AM   Result Value Ref Range    Activated Clotting Time (POC) 164 (H) 79 - 138 SECS   EKG, 12 LEAD, SUBSEQUENT    Collection Time: 12/08/17  1:28 AM   Result Value Ref Range    Ventricular Rate 69 BPM    Atrial Rate 69 BPM    P-R Interval 174 ms    QRS Duration 92 ms    Q-T Interval 406 ms    QTC Calculation (Bezet) 435 ms    Calculated P Axis 57 degrees    Calculated R Axis 51 degrees    Calculated T Axis 68 degrees    Diagnosis       Normal sinus rhythm  Normal ECG  When compared with ECG of 14-JUL-2017 04:28,  No significant change was found               Telemetry:normal sinus rhythm    Imaging:  CXR Results  (Last 48 hours)    None          CT Results  (Last 48 hours)    None                  January-Ruchi SHAFFER PA-C

## 2017-12-08 NOTE — IP AVS SNAPSHOT
[address] [address] Liane Crawley Patient: Boaz Saavedra MRN: [id number] TOO:0/22/5841 My Medications STOP taking these medications   
 metoprolol succinate 25 mg XL tablet Commonly known as:  TOPROL-XL  
   
  
  
TAKE these medications as instructed Instructions Each Dose to Equal  
 Morning Noon Evening Bedtime  
 aspirin delayed-release 81 mg tablet Your last dose was: Your next dose is: Take 81 mg by mouth daily. 81 mg  
    
   
   
   
  
 nitroglycerin 400 mcg/spray spray Commonly known as:  Ronald Wyatt Your last dose was: Your next dose is:    
   
   
 1 Spray by SubLINGual route every five (5) minutes as needed. 1 Spray  
    
   
   
   
  
 rosuvastatin 10 mg tablet Commonly known as:  CRESTOR Your last dose was: Your next dose is:    
   
   
 take 1 tablet by mouth once daily  
     
   
   
   
  
 ticagrelor 90 mg tablet Commonly known as:  [employer] Your last dose was: Your next dose is: Take 1 Tab by mouth two (2) times a day.   
 90 mg

## 2017-12-08 NOTE — PROGRESS NOTES
Problem: Falls - Risk of  Goal: *Absence of Falls  Document Mir Fall Risk and appropriate interventions in the flowsheet.    Outcome: Progressing Towards Goal  Fall Risk Interventions:                      History of Falls Interventions: Door open when patient unattended, Room close to nurse's station

## 2017-12-08 NOTE — CONSULTS
[facility]  CONSULTATIONS    Name:  Tawnya Kunz  MR#:  [id number]  :  1950  Account #:  [de-identified]  Date of Adm:  2017  Date of Consultation:  2017      REASON FOR CONSULTATION: Acute coronary syndrome, ongoing  chest pain. HISTORY: The patient is a 72-year-old  male who presented  to [place] [address] emergency room after an episode of syncope in his garage  with laceration on his head, which required stitches. He was initially  hypotensive, hypothermic and bradycardic. He gradually improved. His  initial EKG showed minor ST depression in inferior leads and minor  downsloping ST elevation in lead AVR only. Gradually, EKG normalized  and after some time the patient started having chest pain. In the  meantime, they reported chest pain, and in retrospect the patient says  that he has been having chest pain for the last couple of weeks, which  is very atypical, no relation to exercise or food, present almost all the  time and he is still having it when he was transferred to this hospital.  Because of the ongoing chest pain and slight abnormalities in the  enzymes, we decided to transfer him for an emergency cardiac  catheterization. His troponin I initially on presentation was less than  0.08 and the second set drawn had increased to 0.22 in the abnormal  range. He denies any chest pain, dyspnea, PND, orthopnea, edema,  palpitations, dizziness or loss of consciousness in the last few days,  but today he did lose consciousness. All he remembers is that he was  sanding something in his garage and then the things became blurry  and he does not remember any event after that until he woke up in the  emergency room. PAST HISTORY: Strongly positive for coronary artery disease. Had a  stent in the RCA and LAD in 2017 by Dr. Chris Chopra. No history of  hypertension, diabetes. He does have hyperlipidemia and is a smoker.   He had a stress test prior to previous catheterization, which was  positive by symptoms, as well as EKG changes. PAST SURGICAL HISTORY: Includes tonsillectomy. PERSONAL HISTORY: Smokes about half a pack of cigarettes a day. Drinks at least 2 alcoholic drinks a day. No drug abuse. FAMILY HISTORY: Positive for CAD in his father. PHYSICAL EXAMINATION  GENERAL: The patient is alert, oriented, pleasant, cooperative at the  time of my exam in [address]: Heart rate is 63, blood pressure 107/64. He is getting  Levothroid at 5 mcg per minute. He is complaining of chest pain 4 on a  scale of 0-10. HEENT: Normocephalic. He does have evidence of trauma with  bruises on his face, as well as a sutured scalp wound. NECK: No definite JVD, bruits, lymph nodes, thyromegaly. LUNGS: No rales or rhonchi. CARDIAC: S1, S2 regular. Did not appreciate any murmur, rub, or  gallop. ABDOMEN: Soft, nontender. EXTREMITIES: No edema, clubbing, cyanosis. Distal pulses are  present. LABORATORY DATA: Available from [place] [address]. Enzymes are trending  upwards. ABG initially pH 7.35, pCO2 32 and pO2 of 100 was done on  28% FIO2. TSH is 10.3 indicating hyperthyroidism. Toxicology is  negative. PT/INR and PTT are normal. Hemoglobin 17.9, white count  of 8.4, and platelets 892,568. Potassium 3.4, BUN 18, creatinine 1.2. EKG sinus rhythm and other changes as described, but the last EKG  from [facility] is essentially within normal limits. IMPRESSION  1. Acute coronary syndrome, ongoing chest pain, history of coronary  artery disease and stents, and increasing enzymes consistent with  possible non-ST-elevation myocardial infarction. 2. Active smoking. 3. Moderate to heavy alcohol use with at least 2 drinks a day. I felt he  was somewhat reluctant to quantify his alcohol. 4. Hyperlipidemia. PLAN: Will continue Levophed drip as now and proceed with emergent  cardiac catheterization.  He has received a heparin bolus in the other  hospital. Will do an ACT and proceed further as needed. The  procedure was discussed with the patient, this including MI, stroke,  death, bleeding, hematoma, etc, were explained. He is willing to  proceed and further treatment depending on the findings of the  catheterization.         Elwyn Runner, MD BG / WILLIE  D:  12/08/2017   00:31  T:  12/08/2017   06:43  Job #:  [id number]

## 2017-12-08 NOTE — ROUTINE PROCESS
TRANSFER - OUT REPORT:    Verbal report given to Francisca Tavarez RN(name) on Stephen Yañez  being transferred to Main ICU(unit) for routine progression of care       Report consisted of patients Situation, Background, Assessment and   Recommendations(SBAR). Information from the following report(s) SBAR, Procedure Summary and MAR was reviewed with the receiving nurse. Lines:       Opportunity for questions and clarification was provided.       Patient transported with:   Monitor  Registered Nurse  Tech

## 2017-12-08 NOTE — H&P
HISTORY & PHYSICAL            Patient: Wesley Mcallister MRN: [id number]  CSN: [id number]    YOB: 1950  Age: 74 y.o. Sex: male    DOA: 12/8/2017 LOS:  LOS: 0 days        DOA: 12/8/2017        Assessment/Plan     Active Problems:    STEMI (ST elevation myocardial infarction) ([address] (12/8/2017)        Plan:  1. STEMI - to cath lab - patent stents , no new lesions   2. H/o CAD with 2 stents placed in the past   3. Chronic smoker   DVT Px - Heparin   Full code           Severity of Signs & Symptoms -- moderate  Risk of adverse events -- moderate  Current Medical Rx Plan - As Above   Patient history & comorbidities - Per HPI  Discharge Plan -- Home            HPI:     Wesley Mcallister is a 74 y.o. male who presented to the ER 2y to STEMI. He mentions that he was working in his garage & likely passed out , doesn't remember the events   Says next thing he remembers is that he is in ambulance. Initial EKG showed STEMI - pt taken to the cath lab   He is a chronic smoker - smokes about 1 ppd , says he has a home gym & works out frequently. Also mentions that he has had 2 stents placed in the past - many years ago. ER eval - STEMI alert called   Hospitalist admit for further eval.     Past Medical History:   Diagnosis Date    Cardiac treadmill stress test 05/17/2017    At least intermediate risk. Positive EKG on maximal EST. Chest pain resolved within 1 min of recovery. ST changes persisted 5 mins of recovery. Ex time 4 min 38 sec.       Mixed hyperlipidemia        Past Surgical History:   Procedure Laterality Date    CARDIAC CATHETERIZATION  7/13/2017         CORONARY ARTERY ANGIOGRAM  7/13/2017         CORONARY STENT EA ADDL VESSEL  7/13/2017         CORONARY STENT SINGLE W/PTCA  7/13/2017         HX TONSILLECTOMY      LV ANGIOGRAPHY  7/13/2017         MODERATE SEDATION  7/13/2017            Family History   Problem Relation Age of Onset    Heart Attack Father     Coronary Artery Disease Father        Social History     Social History    Marital status:      Spouse name: N/A    Number of children: N/A    Years of education: N/A     Social History Main Topics    Smoking status: Former Smoker     Packs/day: 1.50     Years: 40.00     Quit date: 1/7/2017    Smokeless tobacco: Never Used    Alcohol use Yes    Drug use: Not on file    Sexual activity: Not on file     Other Topics Concern    Not on file     Social History Narrative       Prior to Admission medications    Medication Sig Start Date End Date Taking? Authorizing Provider   metoprolol succinate (TOPROL-XL) 25 mg XL tablet Take 0.5 Tabs by mouth daily. 10/17/17   Cedrick Cuevas NP   ticagrelor (BRILINTA) 90 mg tablet Take 1 Tab by mouth every twelve (12) hours every twelve (12) hours. 10/17/17   Cedrick Cuevas NP   nitroglycerin (NITROLINGUAL) 400 mcg/spray spray 1 Spray by SubLINGual route every five (5) minutes as needed. 7/13/17   ABDI Sanchez   ticagrelor (BRILINTA) 90 mg tablet Take 1 Tab by mouth two (2) times a day. 7/13/17   ABDI Sanchez   aspirin delayed-release 81 mg tablet Take 81 mg by mouth daily. Historical Provider   rosuvastatin (CRESTOR) 10 mg tablet take 1 tablet by mouth once daily 6/21/17   Historical Provider       No Known Allergies    Review of Systems  A comprehensive review of systems was negative except for that written in the History of Present Illness. Physical Exam:      Visit Vitals    /52 (BP 1 Location: Right arm, BP Patient Position: At rest)    Pulse 65    Temp 98.1 °F (36.7 °C)    Resp 15    Ht 5' 10\" (1.778 m)    Wt 85.7 kg (188 lb 15 oz)    SpO2 99%    BMI 27.11 kg/m2       Physical Exam:    Gen: In general, this is a well nourished male in no acute distress  HEENT: Sclerae nonicteric. Oral mucous membranes moist. Dentition poor  Neck: Supple with midline trachea. CV: RRR without murmur or rub appreciated. Resp:Respirations are unlabored without use of accessory muscles. Lung fields bilaterally without wheezes or rhonchi. Abd: Soft, nontender, nondistended. Extrem: Extremities are warm, without cyanosis or clubbing. No pitting pretibial edema. Palpable distal pulses X 4.   Skin: Warm, no visible rashes. Neuro: Patient is alert, oriented, and cooperative. No obvious focal defects. Moves all 4 extremities.     Labs Reviewed:    Recent Results (from the past 24 hour(s))   CBC W/O DIFF    Collection Time: 12/08/17 12:37 AM   Result Value Ref Range    WBC 17.1 (H) 4.6 - 13.2 K/uL    RBC 4.21 (L) 4.70 - 5.50 M/uL    HGB 14.2 13.0 - 16.0 g/dL    HCT 39.9 36.0 - 48.0 %    MCV 94.8 74.0 - 97.0 FL    MCH 33.7 24.0 - 34.0 PG    MCHC 35.6 31.0 - 37.0 g/dL    RDW 12.1 11.6 - 14.5 %    PLATELET 809 798 - 343 K/uL    MPV 10.8 9.2 - 11.8 FL   POC ACTIVATED CLOTTING TIME    Collection Time: 12/08/17 12:40 AM   Result Value Ref Range    Activated Clotting Time (POC) 164 (H) 79 - 138 SECS   EKG, 12 LEAD, SUBSEQUENT    Collection Time: 12/08/17  1:28 AM   Result Value Ref Range    Ventricular Rate 69 BPM    Atrial Rate 69 BPM    P-R Interval 174 ms    QRS Duration 92 ms    Q-T Interval 406 ms    QTC Calculation (Bezet) 435 ms    Calculated P Axis 57 degrees    Calculated R Axis 51 degrees    Calculated T Axis 68 degrees    Diagnosis       Normal sinus rhythm  Normal ECG  When compared with ECG of 14-JUL-2017 04:28,  No significant change was found     CBC WITH AUTOMATED DIFF    Collection Time: 12/08/17  5:01 AM   Result Value Ref Range    WBC 8.1 4.6 - 13.2 K/uL    RBC 4.29 (L) 4.70 - 5.50 M/uL    HGB 14.1 13.0 - 16.0 g/dL    HCT 41.1 36.0 - 48.0 %    MCV 95.8 74.0 - 97.0 FL    MCH 32.9 24.0 - 34.0 PG    MCHC 34.3 31.0 - 37.0 g/dL    RDW 12.3 11.6 - 14.5 %    PLATELET 323 031 - 537 K/uL    MPV 10.7 9.2 - 11.8 FL    NEUTROPHILS 78 (H) 40 - 73 %    LYMPHOCYTES 14 (L) 21 - 52 %    MONOCYTES 7 3 - 10 %    EOSINOPHILS 1 0 - 5 % BASOPHILS 0 0 - 2 %    ABS. NEUTROPHILS 6.3 1.8 - 8.0 K/UL    ABS. LYMPHOCYTES 1.1 0.9 - 3.6 K/UL    ABS. MONOCYTES 0.6 0.05 - 1.2 K/UL    ABS. EOSINOPHILS 0.1 0.0 - 0.4 K/UL    ABS. BASOPHILS 0.0 0.0 - 0.1 K/UL    DF AUTOMATED     METABOLIC PANEL, COMPREHENSIVE    Collection Time: 12/08/17  5:01 AM   Result Value Ref Range    Sodium 142 136 - 145 mmol/L    Potassium 4.0 3.5 - 5.5 mmol/L    Chloride 112 (H) 100 - 108 mmol/L    CO2 24 21 - 32 mmol/L    Anion gap 6 3.0 - 18 mmol/L    Glucose 98 74 - 99 mg/dL    BUN 13 7.0 - 18 MG/DL    Creatinine 0.88 0.6 - 1.3 MG/DL    BUN/Creatinine ratio 15 12 - 20      GFR est AA >60 >60 ml/min/1.73m2    GFR est non-AA >60 >60 ml/min/1.73m2    Calcium 7.1 (L) 8.5 - 10.1 MG/DL    Bilirubin, total 0.6 0.2 - 1.0 MG/DL    ALT (SGPT) 40 16 - 61 U/L    AST (SGOT) 44 (H) 15 - 37 U/L    Alk.  phosphatase 46 45 - 117 U/L    Protein, total 5.7 (L) 6.4 - 8.2 g/dL    Albumin 3.1 (L) 3.4 - 5.0 g/dL    Globulin 2.6 2.0 - 4.0 g/dL    A-G Ratio 1.2 0.8 - 1.7     MAGNESIUM    Collection Time: 12/08/17  5:01 AM   Result Value Ref Range    Magnesium 2.1 1.6 - 2.6 mg/dL   PHOSPHORUS    Collection Time: 12/08/17  5:01 AM   Result Value Ref Range    Phosphorus 3.2 2.5 - 4.9 MG/DL   TSH 3RD GENERATION    Collection Time: 12/08/17  5:01 AM   Result Value Ref Range    TSH 1.17 0.36 - 3.74 uIU/mL   CARDIAC PANEL,(CK, CKMB & TROPONIN)    Collection Time: 12/08/17  5:01 AM   Result Value Ref Range    CK 1188 (H) 39 - 308 U/L    CK - MB 67.0 (H) <3.6 ng/ml    CK-MB Index 5.6 (H) 0.0 - 4.0 %    Troponin-I, Qt. 0.61 (H) 0.0 - 0.045 NG/ML   PROTHROMBIN TIME + INR    Collection Time: 12/08/17  5:01 AM   Result Value Ref Range    Prothrombin time 14.1 11.5 - 15.2 sec    INR 1.1 0.8 - 1.2     PTT    Collection Time: 12/08/17  5:01 AM   Result Value Ref Range    aPTT 25.6 23.0 - 36.4 SEC   CALCIUM, IONIZED    Collection Time: 12/08/17  5:01 AM   Result Value Ref Range    Ionized Calcium 1.08 (L) 1.12 - 1.32 MMOL/L Imaging Reviewed:  None           Zafar Browning MD  12/8/2017, 12:45 AM

## 2017-12-08 NOTE — IP AVS SNAPSHOT
[address] Patient: Wesley Mcallister MRN: [id number] UCU:[id number]/30/[id number] About your hospitalization You were admitted on:  December 8, 2017 You last received care in the:  [facility] [address] You were discharged on:  December 9, 2017 Why you were hospitalized Your primary diagnosis was:  Not on File Your diagnoses also included:  Stemi (St Elevation Myocardial Infarction) (Hcc) Things You Need To Do (next 8 weeks) Follow up with Consuelo Lu MD in 1 week(s) The  will contact you on Monday with an appointment date and time. Phone:  [phone] Where:  [place], Genna Ahumada [address] [facility] [address] Follow up with Blake Hays MD in 3 day(s) The  will contact you on Monday with an appointment date and time. Phone:  [phone] [facility]  [facility], [address] [place] [address] Drive, [facility], Tristen Salmon [id number] Discharge Orders None A check justina indicates which time of day the medication should be taken. My Medications STOP taking these medications   
 metoprolol succinate 25 mg XL tablet Commonly known as:  TOPROL-XL  
   
  
  
TAKE these medications as instructed Instructions Each Dose to Equal  
 Morning Noon Evening Bedtime  
 aspirin delayed-release 81 mg tablet Your last dose was: Your next dose is: Take 81 mg by mouth daily. 81 mg  
    
   
   
   
  
 nitroglycerin 400 mcg/spray spray Commonly known as:  Eugenie Span Your last dose was: Your next dose is:    
   
   
 1 Spray by SubLINGual route every five (5) minutes as needed. 1 Spray  
    
   
   
   
  
 rosuvastatin 10 mg tablet Commonly known as:  CRESTOR Your last dose was: Your next dose is:    
   
   
 take 1 tablet by mouth once daily ticagrelor 90 mg tablet Commonly known as:  Po-Breanne [employer] Your last dose was: Your next dose is: Take 1 Tab by mouth two (2) times a day. 90 mg Discharge Instructions Percutaneous Coronary Intervention: What to Expect at [facility] Your Recovery Percutaneous coronary intervention (PCI) is the name for procedures that are used to open a narrowed or blocked coronary artery. The two most common PCI procedures are coronary angioplasty and coronary stent placement. Your groin or arm may have a bruise and feel sore for a day or two after a percutaneous coronary intervention (PCI). You can do light activities around the house, but nothing strenuous for several days. This care sheet gives you a general idea about how long it will take for you to recover. But each person recovers at a different pace. Follow the steps below to get better as quickly as possible. How can you care for yourself at home? Activity ? · If the doctor gave you a sedative: ¨ For 24 hours, don't do anything that requires attention to detail. It takes time for the medicine's effects to completely wear off. ¨ For your safety, do not drive or operate any machinery that could be dangerous. Wait until the medicine wears off and you can think clearly and react easily. ? · Do not do strenuous exercise and do not lift, pull, or push anything heavy until your doctor says it is okay. This may be for a day or two. You can walk around the house and do light activity, such as cooking. ? · If the catheter was placed in your groin, try not to walk up stairs for the first couple of days. ? · If the catheter was placed in your arm near your wrist, do not bend your wrist deeply for the first couple of days. Be careful using your hand to get into and out of a chair or bed. ? · Carry your stent identification card with you at all times. ? · If your doctor recommends it, get more exercise. Walking is a good choice. Bit by bit, increase the amount you walk every day. Try for at least 30 minutes on most days of the week. Diet ? · Drink plenty of fluids to help your body flush out the dye. If you have kidney, heart, or liver disease and have to limit fluids, talk with your doctor before you increase the amount of fluids you drink. ? · Keep eating a heart-healthy diet that has lots of fruits, vegetables, and whole grains. If you have not been eating this way, talk to your doctor. You also may want to talk to a dietitian. This expert can help you to learn about healthy foods and plan meals. Medicines ? · Your doctor will tell you if and when you can restart your medicines. He or she will also give you instructions about taking any new medicines. ? · If you take blood thinners, such as warfarin (Coumadin), clopidogrel (Plavix), or aspirin, be sure to talk to your doctor. He or she will tell you if and when to start taking those medicines again. Make sure that you understand exactly what your doctor wants you to do.  
? · Your doctor will prescribe blood-thinning medicines. You will likely take aspirin plus another antiplatelet, such as clopidogrel (Plavix). It is very important that you take these medicines exactly as directed. These medicines help keep the coronary artery open and reduce your risk of a heart attack. ? · Call your doctor if you think you are having a problem with your medicine. ?Care of the catheter site ? · For 1 or 2 days, keep a bandage over the spot where the catheter was inserted. The bandage probably will fall off in this time. ? · Put ice or a cold pack on the area for 10 to 20 minutes at a time to help with soreness or swelling. Put a thin cloth between the ice and your skin. ? · You may shower 24 to 48 hours after the procedure, if your doctor okays it. Pat the incision dry. ? · Do not soak the catheter site until it is healed. Don't take a bath for 1 week, or until your doctor tells you it isokay. Follow-up care is a key part of your treatment and safety. Be sure to make and go to all appointments, and call your doctor if you are having problems. It's also a good idea to know your test results and keep a list of the medicines you take. When should you call for help? Call 911 anytime you think you may need emergency care. For example, call if: 
? · You passed out (lost consciousness). ? · You have severe trouble breathing. ? · You have sudden chest pain and shortness of breath, or you cough up blood. ? · You have symptoms of a heart attack, such as: ¨ Chest pain or pressure. ¨ Sweating. ¨ Shortness of breath. ¨ Nausea or vomiting. ¨ Pain that spreads from the chest to the neck, jaw, or one or both shoulders or arms. ¨ Dizziness or lightheadedness. ¨ A fast or uneven pulse. After calling 911, chew 1 adult-strength aspirin. Wait for an ambulance. Do not try to drive yourself. ? · You have been diagnosed with angina, and you have angina symptoms that do not go away with rest or are not getting better within 5 minutes after you take one dose of nitroglycerin. ?Call your doctor now or seek immediate medical care if: 
? · You are bleeding from the area where the catheter was put in your artery. ? · You have a fast-growing, painful lump at the catheter site. ? · You have signs of infection, such as: 
¨ Increased pain, swelling, warmth, or redness. ¨ Red streaks leading from the catheter site. ¨ Pus draining from the catheter site. ¨ A fever. ? · Your leg or arm looks blue or feels cold, numb, or tingly. ? Watch closely for changes in your health, and be sure to contact your doctor if you have any problems. Where can you learn more? Go to http://kun-geraldine.info/.  
Enter S251 in the search box to learn more about \"Percutaneous Coronary Intervention: What to Expect at Home. \" Current as of: September 21, 2016 Content Version: 11.4 © 7372-1412 Stayful. Care instructions adapted under license by [employer] (which disclaims liability or warranty for this information). If you have questions about a medical condition or this instruction, always ask your healthcare professional. [address] any warranty or liability for your use of this information. DISCHARGE SUMMARY from Nurse PATIENT INSTRUCTIONS: 
 
 
F-face looks uneven A-arms unable to move or move unevenly S-speech slurred or non-existent T-time-call 911 as soon as signs and symptoms begin-DO NOT go Back to bed or wait to see if you get better-TIME IS BRAIN. Warning Signs of HEART ATTACK Call 911 if you have these symptoms: 
? Chest discomfort. Most heart attacks involve discomfort in the center of the chest that lasts more than a few minutes, or that goes away and comes back. It can feel like uncomfortable pressure, squeezing, fullness, or pain. ? Discomfort in other areas of the upper body. Symptoms can include pain or discomfort in one or both arms, the back, neck, jaw, or stomach. ? Shortness of breath with or without chest discomfort. ? Other signs may include breaking out in a cold sweat, nausea, or lightheadedness. Don't wait more than five minutes to call [address]! Fast action can save your life. Calling 911 is almost always the fastest way to get lifesaving treatment. Emergency Medical Services staff can begin treatment when they arrive  up to an hour sooner than if someone gets to the hospital by car. The discharge information has been reviewed with the patient and spouse. The patient and spouse verbalized understanding. Discharge medications reviewed with the patient and spouse and appropriate educational materials and side effects teaching were provided. Patient armband removed and shredded 
____________________________________________________________________________________________________________________ 
 
_______________ ScaleXtremehart Announcement We are excited to announce that we are making your provider's discharge notes available to you in DermaGen. You will see these notes when they are completed and signed by the physician that discharged you from your recent hospital stay. If you have any questions or concerns about any information you see in DermaGen, please call the Health Information Department where you were seen or reach out to your Primary Care Provider for more information about your plan of care. Introducing [facility]! Rolf Ravi introduces DermaGen patient portal. Now you can access parts of your medical record, email your doctor's office, and request medication refills online. 1. In your internet browser, go to [email or web address] 2. Click on the First Time User? Click Here link in the Sign In box. You will see the New Member Sign Up page. 3. Enter your DermaGen Access Code exactly as it appears below. You will not need to use this code after youve completed the sign-up process. If you do not sign up before the expiration date, you must request a new code. · DermaGen Access Code: [email or web address] Expires: 3/9/2018  5:43 PM 
 
4. Enter the last four digits of your Social Security Number (xxxx) and Date of Birth (mm/dd/yyyy) as indicated and click Submit. You will be taken to the next sign-up page. 5. Create a DermaGen ID. This will be your Kamcordt login ID and cannot be changed, so think of one that is secure and easy to remember. 6. Create a Pursuit Management password. You can change your password at any time. 7. Enter your Password Reset Question and Answer. This can be used at a later time if you forget your password. 8. Enter your e-mail address. You will receive e-mail notification when new information is available in [address] 9. Click Sign Up. You can now view and download portions of your medical record. 10. Click the Download Summary menu link to download a portable copy of your medical information. If you have questions, please visit the Frequently Asked Questions section of the Pursuit Management website. Remember, Pursuit Management is NOT to be used for urgent needs. For medical emergencies, dial 911. Now available from your iPhone and Android! Providers Seen During Your Hospitalization Provider Specialty Primary office phone Arabella Gonzales MD Emergency Medicine [phone] Eduardo Jha MD Internal Medicine [phone] Immunizations Administered for This Admission Name Date Influenza Vaccine (Quad) PF 12/9/2017 Your Primary Care Physician (PCP) Primary Care Physician Office Phone Office Fax Tatyana Dean, [address] [place], [place] [phone] You are allergic to the following No active allergies Recent Documentation Height Weight BMI Smoking Status 1.778 m 85.7 kg 27.11 kg/m2 Former Smoker Emergency Contacts Name Discharge Info Relation Home Work Mobile Monika Escalante DISCHARGE CAREGIVER [3] Spouse [3]   [phone] Patient Belongings The following personal items are in your possession at time of discharge: 
  Dental Appliances: None  Visual Aid: None      Home Medications: None   Jewelry: None  Clothing: At bedside    Other Valuables: None  Personal Items Sent to Safe: no 
 
  
  
Discharge Instructions Attachments/References CARDIAC EVENT MONITOR: EXTERNAL (ENGLISH) VASOVAGAL SYNCOPE (ENGLISH) Patient Handouts Cardiac Event Monitoring: About This Test 
What is it? Cardiac event monitoring is a test that records the electrical activity of your heart. The test is done with a heart monitor device that you may wear or keep with you for up to a month. This test may be done to find out why you're having symptoms. Or it may be done to look for heartbeats that are too fast, too slow, or irregular. These are cardiac events. The monitor will give your doctor information similar to an electrocardiogram (EKG or ECG). An EKG translates the heart's electrical activity into line tracings on paper. There are different types of monitors. Your doctor will choose the type that works best for you and is most likely to help diagnose your heart problem. These monitors are safe to use. No electricity is sent through your body. So there is no chance of getting an electric shock. Why is this test done? This test is used to look for irregular heartbeats. It can help your doctor find out what is causing chest pain, fainting, lightheadedness, or other symptoms of heart problems. It also can help the doctor check to see if treatment for an irregular heartbeat is working. Many people have irregular heartbeats from time to time. Because these kinds of heartbeats can come and go, it may be hard to record one while you are in the doctor's office. Monitoring your heart for a longer time and during your normal activities makes it easier to capture your cardiac events. What happens before the test? 
If you are getting a monitor with electrode pads on your chest: 
· Several areas on your chest may be shaved and cleaned. Then a small amount of gel will be put on those areas. The electrode pads will then be attached to the skin of your chest. Thin wires will connect the electrodes to the monitor. · You will get instructions for how and when to change the electrodes at home. Some types of monitors don't use electrode pads.  Some types are worn on your wrist like a watch. Others are stuck to your chest with a sticky patch. Or you may have a monitor that you carry with you. Your doctor will explain which type of monitor you have and how to use it. What happens during the test? 
· Your monitor may start recording on its own when it detects an abnormal heartbeat. Or you may need to do something to start the recording when you have symptoms. You might use a handheld device to start the monitor. Or you may need to press a button on the monitor itself. Your doctor will explain which type you have and what you need to do. · You may keep a diary of what you were doing when you had symptoms such as chest pain or dizziness. Your doctor will show you how to do this. · You may need to send the information from your monitor to your doctor through a phone line or online. Some monitors send it automatically. You will get instructions from your doctor. Your information will stay private and secure whether you send it or it is sent automatically. · You may be able to do most of the things you usually do. But it's important to follow your doctor's instructions for bathing, exercise, and other daily activities. How long does the test take? You may use the monitor for up to a month or longer. It depends on how long it takes to record irregular heartbeat episodes. It also depends on how long your doctor wants to keep monitoring your heart. What happens after the test? 
· Tammy Vasques return the monitor to your doctor's office or hospital. 
· You'll meet with your doctor to talk about the information recorded during your test. 
· Your doctor will check your diary of symptoms. He or she will compare the timing of your activities and symptoms with the recorded heart pattern. · Depending on your test results, your doctor may talk with you about other tests or treatment options. Follow-up care is a key part of your treatment and safety.  Be sure to make and go to all appointments, and call your doctor if you are having problems. It's also a good idea to keep a list of the medicines you take. Ask your doctor when you can expect to have your test results. Where can you learn more? Go to http://kun-geraldine.info/. Enter L575 in the search box to learn more about \"Cardiac Event Monitoring: About This Test.\" Current as of: September 21, 2016 Content Version: 11.4 © 0602-0863 Hopper. Care instructions adapted under license by [employer] (which disclaims liability or warranty for this information). If you have questions about a medical condition or this instruction, always ask your healthcare professional. [address] any warranty or liability for your use of this information. Vasovagal Syncope: Care Instructions Your Care Instructions Vasovagal syncope (say \"ijw-ous-NZF-gul CWBA-drk-szg\")is sudden dizziness or fainting that can be set off by things such as pain, stress, fear, or trauma. You may sweat or feel lightheaded, sick to your stomach, or tingly. The problem causes the heart rate to slow and the blood vessels to widen, or dilate, for a short time. When this happens, blood pools in the lower body, and less blood goes to the brain. You can usually get relief by lying down with your legs raised (elevated). This helps more blood to flow to your brain and may help relieve symptoms like feeling dizzy. Some doctors may recommend a technique that involves tensing your fists and arms. This type of fainting is often easy to predict. For example, it happens to some people when they see blood or have to get a shot. They may feel symptoms before they faint. An episode of vasovagal syncope usually responds well to self-care. Other treatment often isn't needed. But if the fainting keeps happening, your doctor may suggest further treatments. Follow-up care is a key part of your treatment and safety. Be sure to make and go to all appointments, and call your doctor if you are having problems. It's also a good idea to know your test results and keep a list of the medicines you take. How can you care for yourself at home? · Drink plenty of fluids to prevent dehydration. If you have kidney, heart, or liver disease and have to limit fluids, talk with your doctor before you increase your fluid intake. · Try to avoid things that you think may set off vasovagal syncope. · Talk to your doctor about any medicines you take. Some medicines may increase the chance of this condition occurring. · If you feel symptoms, lie down with your legs raised. Talk to your doctor about what to do if your symptoms come back. When should you call for help? Call 911 anytime you think you may need emergency care. For example, call if: 
? · You have symptoms of a heart problem. These may include: ¨ Chest pain or pressure. ¨ Severe trouble breathing. ¨ A fast or irregular heartbeat. ? Watch closely for changes in your health, and be sure to contact your doctor if: 
? · You have more episodes of fainting at home. ? · You do not get better as expected. Where can you learn more? Go to http://kun-geraldine.info/. Enter L754 in the search box to learn more about \"Vasovagal Syncope: Care Instructions. \" Current as of: March 20, 2017 Content Version: 11.4 © 2092-4659 Proginet. Care instructions adapted under license by [employer] (which disclaims liability or warranty for this information). If you have questions about a medical condition or this instruction, always ask your healthcare professional. [address] any warranty or liability for your use of this information. Please provide this summary of care documentation to your next provider. Signatures-by signing, you are acknowledging that this After Visit Summary has been reviewed with you and you have received a copy. Patient Signature:  ____________________________________________________________ Date:  ____________________________________________________________  
  
Suzon Mems Provider Signature:  ____________________________________________________________ Date:  ____________________________________________________________

## 2017-12-08 NOTE — PROGRESS NOTES
attended the interdisciplinary rounds for Bladimir Hargrove, who is a 74 y. o.,male. Patients Primary Language is: Mireya Kayleigh. According to the patients EMR Anabaptism Affiliation is: Rafael Otto. The reason the Patient came to the hospital is:   Patient Active Problem List    Diagnosis Date Noted    STEMI (ST elevation myocardial infarction) ([facility] [address] 12/08/2017    CAD (coronary artery disease) 07/13/2017    [facility] 07/13/2017      Plan:  Chaplains will continue to follow and will provide pastoral care on an as needed/requested basis.  recommends bedside caregivers page  on duty if patient shows signs of acute spiritual or emotional distress.     [address] [facility]  Board Certified [address] [place] [address]   [phone]

## 2017-12-08 NOTE — PROGRESS NOTES
[facility] - Progress Note  Admit Date: 12/8/2017    Assessment:     Hospital Problems  Never Reviewed          Codes Class Noted POA    STEMI (ST elevation myocardial infarction) ([facility] [address] ICD-10-CM: I21.3  ICD-9-CM: 410.90  12/8/2017 Unknown              -Syncope concerning for dysrhythmia given lack of preceding symptoms. Presented to [facility]. Initially hypotensive, hypothermic and bradycardic. Placed on levophed overnight. No further bradycardia. Now low normal BP. On BB as outpatient.  -Chest pain in setting of known CAD with ST depression in inferior leads and minor downsloping ST elevation in lead AVR with indeterminate troponin. STEMI code called and patient transferred to [facility] for urgent cath. Cath this admission revealed patent stents without new lesions. -CAD s/p PCI to RCA and LAD both with RADHA 7/13/2017. On ASA, brilinta, statin and BB as outpatient.  -Normal LV function by LV gram 7/2017.  -Dyslipidemia. On statin.  -Tobacco abuse.   -Etoh use. Primary cardiologist Dr. Nicolas Richter. Plan: Will continue close telemetry monitoring for at least 48 hours. Ok to transfer out to stepdown. Will get echocardiogram.  Will hold BB and follow hemodynamics. Will resume ASA, Brilinta, Statin. Subjective:     No CP overnight. No SOB. Weaned off levo.     Objective:      Patient Vitals for the past 8 hrs:   Temp Pulse Resp BP SpO2   12/08/17 0944 - 74 14 105/55 99 %   12/08/17 0900 - 69 15 - -   12/08/17 0810 - - - 99/49 -   12/08/17 0800 97.8 °F (36.6 °C) 69 12 99/49 98 %   12/08/17 0731 97.8 °F (36.6 °C) 69 12 99/49 98 %   12/08/17 0700 - 68 12 91/61 98 %   12/08/17 0600 - 65 15 106/52 99 %   12/08/17 0530 - 66 - (!) 88/40 100 %   12/08/17 0500 - 70 15 109/52 100 %   12/08/17 0430 - 65 - 100/60 98 %   12/08/17 0400 98.1 °F (36.7 °C) 67 10 99/62 98 %   12/08/17 0330 - 71 - 96/60 98 %   12/08/17 0300 - 69 18 123/63 99 %   12/08/17 0230 - 68 - 123/78 99 %         Patient Vitals for the past 96 hrs:   Weight   12/08/17 0121 85.7 kg (188 lb 15 oz)   12/08/17 0014 81.6 kg (180 lb)                    Intake/Output Summary (Last 24 hours) at 12/08/17 1019  Last data filed at 12/08/17 1000   Gross per 24 hour   Intake           806.64 ml   Output                0 ml   Net           806.64 ml       Physical Exam:  General:  alert, cooperative, no distress, appears stated age  Neck:  no JVD  Lungs:  clear to auscultation bilaterally  Heart:  regular rate and rhythm  Abdomen:  abdomen is soft without significant tenderness, masses, organomegaly or guarding  Extremities:  extremities normal, atraumatic, no cyanosis or edema. Right groin soft without hematoma with full peripheral pulses.     Data Review:     Labs: Results:       Chemistry Recent Labs      12/08/17   0501   GLU  98   NA  142   K  4.0   CL  112*   CO2  24   BUN  13   CREA  0.88   CA  7.1*   MG  2.1   PHOS  3.2   AGAP  6   BUCR  15   AP  46   TP  5.7*   ALB  3.1*   GLOB  2.6   AGRAT  1.2      CBC w/Diff Recent Labs      12/08/17   0501  12/08/17   0037   WBC  8.1  17.1*   RBC  4.29*  4.21*   HGB  14.1  14.2   HCT  41.1  39.9   PLT  156  166   GRANS  78*   --    LYMPH  14*   --    EOS  1   --       Cardiac Enzymes Lab Results   Component Value Date/Time    CPK 1188 (H) 12/08/2017 05:01 AM    CKMB 67.0 (H) 12/08/2017 05:01 AM    CKND1 5.6 (H) 12/08/2017 05:01 AM    TROIQ 0.61 (H) 12/08/2017 05:01 AM      Coagulation Recent Labs      12/08/17   0501   PTP  14.1   INR  1.1   APTT  25.6       Lipid Panel Lab Results   Component Value Date/Time    Cholesterol, total 226 04/28/2010 10:00 AM    HDL Cholesterol 53 04/28/2010 10:00 AM    LDL, calculated 147.8 04/28/2010 10:00 AM    VLDL, calculated 25.2 04/28/2010 10:00 AM    Triglyceride 126 04/28/2010 10:00 AM    CHOL/HDL Ratio 4.3 04/28/2010 10:00 AM      BNP No results found for: BNP, BNPP, XBNPT   Liver Enzymes Recent Labs      12/08/17   0501   TP  5.7*   ALB  3.1*   AP  46   SGOT  44*      Digoxin Thyroid Studies Lab Results   Component Value Date/Time    TSH 1.17 12/08/2017 05:01 AM          Signed By: ABDI Cabrera     December 8, 2017

## 2017-12-09 ENCOUNTER — APPOINTMENT (OUTPATIENT)
Dept: MRI IMAGING | Age: 67
DRG: 287 | End: 2017-12-09
Attending: INTERNAL MEDICINE
Payer: COMMERCIAL

## 2017-12-09 VITALS
RESPIRATION RATE: 17 BRPM | TEMPERATURE: 98.7 F | DIASTOLIC BLOOD PRESSURE: 60 MMHG | OXYGEN SATURATION: 97 % | BODY MASS INDEX: 27.05 KG/M2 | HEART RATE: 77 BPM | HEIGHT: 70 IN | WEIGHT: 188.93 LBS | SYSTOLIC BLOOD PRESSURE: 119 MMHG

## 2017-12-09 LAB
ALBUMIN SERPL-MCNC: 3.1 G/DL (ref 3.4–5)
ALBUMIN/GLOB SERPL: 1.2 {RATIO} (ref 0.8–1.7)
ALP SERPL-CCNC: 44 U/L (ref 45–117)
ALT SERPL-CCNC: 36 U/L (ref 16–61)
ANION GAP SERPL CALC-SCNC: 6 MMOL/L (ref 3–18)
AST SERPL-CCNC: 32 U/L (ref 15–37)
ATRIAL RATE: 59 BPM
ATRIAL RATE: 69 BPM
BILIRUB SERPL-MCNC: 0.3 MG/DL (ref 0.2–1)
BUN SERPL-MCNC: 9 MG/DL (ref 7–18)
BUN/CREAT SERPL: 12 (ref 12–20)
CALCIUM SERPL-MCNC: 7.6 MG/DL (ref 8.5–10.1)
CALCULATED P AXIS, ECG09: 49 DEGREES
CALCULATED P AXIS, ECG09: 57 DEGREES
CALCULATED R AXIS, ECG10: 37 DEGREES
CALCULATED R AXIS, ECG10: 51 DEGREES
CALCULATED T AXIS, ECG11: 54 DEGREES
CALCULATED T AXIS, ECG11: 68 DEGREES
CHLORIDE SERPL-SCNC: 113 MMOL/L (ref 100–108)
CHOLEST SERPL-MCNC: 93 MG/DL
CO2 SERPL-SCNC: 25 MMOL/L (ref 21–32)
CREAT SERPL-MCNC: 0.75 MG/DL (ref 0.6–1.3)
DIAGNOSIS, 93000: NORMAL
DIAGNOSIS, 93000: NORMAL
ERYTHROCYTE [DISTWIDTH] IN BLOOD BY AUTOMATED COUNT: 12.3 % (ref 11.6–14.5)
GLOBULIN SER CALC-MCNC: 2.5 G/DL (ref 2–4)
GLUCOSE SERPL-MCNC: 108 MG/DL (ref 74–99)
HCT VFR BLD AUTO: 35.9 % (ref 36–48)
HDLC SERPL-MCNC: 43 MG/DL (ref 40–60)
HDLC SERPL: 2.2 {RATIO} (ref 0–5)
HGB BLD-MCNC: 12.4 G/DL (ref 13–16)
LDLC SERPL CALC-MCNC: 33.4 MG/DL (ref 0–100)
LIPID PROFILE,FLP: NORMAL
MCH RBC QN AUTO: 33.1 PG (ref 24–34)
MCHC RBC AUTO-ENTMCNC: 34.5 G/DL (ref 31–37)
MCV RBC AUTO: 95.7 FL (ref 74–97)
P-R INTERVAL, ECG05: 174 MS
P-R INTERVAL, ECG05: 176 MS
PLATELET # BLD AUTO: 144 K/UL (ref 135–420)
PMV BLD AUTO: 10.9 FL (ref 9.2–11.8)
POTASSIUM SERPL-SCNC: 3.8 MMOL/L (ref 3.5–5.5)
PROT SERPL-MCNC: 5.6 G/DL (ref 6.4–8.2)
Q-T INTERVAL, ECG07: 406 MS
Q-T INTERVAL, ECG07: 418 MS
QRS DURATION, ECG06: 84 MS
QRS DURATION, ECG06: 92 MS
QTC CALCULATION (BEZET), ECG08: 413 MS
QTC CALCULATION (BEZET), ECG08: 435 MS
RBC # BLD AUTO: 3.75 M/UL (ref 4.7–5.5)
SODIUM SERPL-SCNC: 144 MMOL/L (ref 136–145)
TRIGL SERPL-MCNC: 83 MG/DL (ref ?–150)
VENTRICULAR RATE, ECG03: 59 BPM
VENTRICULAR RATE, ECG03: 69 BPM
VLDLC SERPL CALC-MCNC: 16.6 MG/DL
WBC # BLD AUTO: 5.4 K/UL (ref 4.6–13.2)

## 2017-12-09 PROCEDURE — 93005 ELECTROCARDIOGRAM TRACING: CPT

## 2017-12-09 PROCEDURE — 80053 COMPREHEN METABOLIC PANEL: CPT | Performed by: HOSPITALIST

## 2017-12-09 PROCEDURE — 90686 IIV4 VACC NO PRSV 0.5 ML IM: CPT | Performed by: HOSPITALIST

## 2017-12-09 PROCEDURE — B2111ZZ FLUOROSCOPY OF MULTIPLE CORONARY ARTERIES USING LOW OSMOLAR CONTRAST: ICD-10-PCS | Performed by: INTERNAL MEDICINE

## 2017-12-09 PROCEDURE — 85027 COMPLETE CBC AUTOMATED: CPT | Performed by: HOSPITALIST

## 2017-12-09 PROCEDURE — 90471 IMMUNIZATION ADMIN: CPT

## 2017-12-09 PROCEDURE — 80061 LIPID PANEL: CPT | Performed by: HOSPITALIST

## 2017-12-09 PROCEDURE — 70551 MRI BRAIN STEM W/O DYE: CPT

## 2017-12-09 PROCEDURE — 36415 COLL VENOUS BLD VENIPUNCTURE: CPT | Performed by: HOSPITALIST

## 2017-12-09 PROCEDURE — 74011250637 HC RX REV CODE- 250/637: Performed by: PHYSICIAN ASSISTANT

## 2017-12-09 PROCEDURE — 74011250636 HC RX REV CODE- 250/636: Performed by: INTERNAL MEDICINE

## 2017-12-09 PROCEDURE — 74011250636 HC RX REV CODE- 250/636: Performed by: HOSPITALIST

## 2017-12-09 RX ORDER — LORAZEPAM 2 MG/ML
1 INJECTION INTRAMUSCULAR
Status: COMPLETED | OUTPATIENT
Start: 2017-12-09 | End: 2017-12-09

## 2017-12-09 RX ADMIN — ASPIRIN 81 MG 81 MG: 81 TABLET ORAL at 08:52

## 2017-12-09 RX ADMIN — LORAZEPAM 1 MG: 2 INJECTION INTRAMUSCULAR at 14:35

## 2017-12-09 RX ADMIN — Medication 10 ML: at 06:44

## 2017-12-09 RX ADMIN — INFLUENZA VIRUS VACCINE 0.5 ML: 15; 15; 15; 15 SUSPENSION INTRAMUSCULAR at 17:49

## 2017-12-09 RX ADMIN — TICAGRELOR 90 MG: 90 TABLET ORAL at 08:52

## 2017-12-09 NOTE — ROUTINE PROCESS
Bedside and Verbal shift change report given to roman mallory rn (oncoming nurse) by Fausto Blanchard RN (offgoing nurse). Report included the following information ED Summary, Procedure Summary, Intake/Output, MAR, Recent Results and Cardiac Rhythm sr. Pt talkative without complaint of pain,discomfort or sob,states is going home states will return for any further procedures. Discussed sudden arrhythmias & possible need for monitoring follow MD advice.

## 2017-12-09 NOTE — DISCHARGE INSTRUCTIONS
Percutaneous Coronary Intervention: What to Expect at [facility]    Percutaneous coronary intervention (PCI) is the name for procedures that are used to open a narrowed or blocked coronary artery. The two most common PCI procedures are coronary angioplasty and coronary stent placement. Your groin or arm may have a bruise and feel sore for a day or two after a percutaneous coronary intervention (PCI). You can do light activities around the house, but nothing strenuous for several days. This care sheet gives you a general idea about how long it will take for you to recover. But each person recovers at a different pace. Follow the steps below to get better as quickly as possible. How can you care for yourself at home? Activity  ? · If the doctor gave you a sedative:  ¨ For 24 hours, don't do anything that requires attention to detail. It takes time for the medicine's effects to completely wear off. ¨ For your safety, do not drive or operate any machinery that could be dangerous. Wait until the medicine wears off and you can think clearly and react easily. ? · Do not do strenuous exercise and do not lift, pull, or push anything heavy until your doctor says it is okay. This may be for a day or two. You can walk around the house and do light activity, such as cooking. ? · If the catheter was placed in your groin, try not to walk up stairs for the first couple of days. ? · If the catheter was placed in your arm near your wrist, do not bend your wrist deeply for the first couple of days. Be careful using your hand to get into and out of a chair or bed. ? · Carry your stent identification card with you at all times. ? · If your doctor recommends it, get more exercise. Walking is a good choice. Bit by bit, increase the amount you walk every day. Try for at least 30 minutes on most days of the week. Diet  ? · Drink plenty of fluids to help your body flush out the dye.  If you have kidney, heart, or liver disease and have to limit fluids, talk with your doctor before you increase the amount of fluids you drink. ? · Keep eating a heart-healthy diet that has lots of fruits, vegetables, and whole grains. If you have not been eating this way, talk to your doctor. You also may want to talk to a dietitian. This expert can help you to learn about healthy foods and plan meals. Medicines  ? · Your doctor will tell you if and when you can restart your medicines. He or she will also give you instructions about taking any new medicines. ? · If you take blood thinners, such as warfarin (Coumadin), clopidogrel (Plavix), or aspirin, be sure to talk to your doctor. He or she will tell you if and when to start taking those medicines again. Make sure that you understand exactly what your doctor wants you to do.   ? · Your doctor will prescribe blood-thinning medicines. You will likely take aspirin plus another antiplatelet, such as clopidogrel (Plavix). It is very important that you take these medicines exactly as directed. These medicines help keep the coronary artery open and reduce your risk of a heart attack. ? · Call your doctor if you think you are having a problem with your medicine. ?Care of the catheter site  ? · For 1 or 2 days, keep a bandage over the spot where the catheter was inserted. The bandage probably will fall off in this time. ? · Put ice or a cold pack on the area for 10 to 20 minutes at a time to help with soreness or swelling. Put a thin cloth between the ice and your skin. ? · You may shower 24 to 48 hours after the procedure, if your doctor okays it. Pat the incision dry. ? · Do not soak the catheter site until it is healed. Don't take a bath for 1 week, or until your doctor tells you it isokay. Follow-up care is a key part of your treatment and safety. Be sure to make and go to all appointments, and call your doctor if you are having problems.  It's also a good idea to know your test results and keep a list of the medicines you take. When should you call for help? Call 911 anytime you think you may need emergency care. For example, call if:  ? · You passed out (lost consciousness). ? · You have severe trouble breathing. ? · You have sudden chest pain and shortness of breath, or you cough up blood. ? · You have symptoms of a heart attack, such as:  ¨ Chest pain or pressure. ¨ Sweating. ¨ Shortness of breath. ¨ Nausea or vomiting. ¨ Pain that spreads from the chest to the neck, jaw, or one or both shoulders or arms. ¨ Dizziness or lightheadedness. ¨ A fast or uneven pulse. After calling 911, chew 1 adult-strength aspirin. Wait for an ambulance. Do not try to drive yourself. ? · You have been diagnosed with angina, and you have angina symptoms that do not go away with rest or are not getting better within 5 minutes after you take one dose of nitroglycerin. ?Call your doctor now or seek immediate medical care if:  ? · You are bleeding from the area where the catheter was put in your artery. ? · You have a fast-growing, painful lump at the catheter site. ? · You have signs of infection, such as:  ¨ Increased pain, swelling, warmth, or redness. ¨ Red streaks leading from the catheter site. ¨ Pus draining from the catheter site. ¨ A fever. ? · Your leg or arm looks blue or feels cold, numb, or tingly. ? Watch closely for changes in your health, and be sure to contact your doctor if you have any problems. Where can you learn more? Go to http://kun-geraldine.info/. Enter J464 in the search box to learn more about \"Percutaneous Coronary Intervention: What to Expect at Home. \"  Current as of: September 21, 2016  Content Version: 11.4  © 5178-2962 [employer]. Care instructions adapted under license by [employer] (which disclaims liability or warranty for this information).  If you have questions about a medical condition or this instruction, always ask your healthcare professional. [place] [address] any warranty or liability for your use of this information. DISCHARGE SUMMARY from Nurse    PATIENT INSTRUCTIONS:    After general anesthesia or intravenous sedation, for 24 hours or while taking prescription Narcotics:  · Limit your activities  · Do not drive and operate hazardous machinery  · Do not make important personal or business decisions  · Do  not drink alcoholic beverages  · If you have not urinated within 8 hours after discharge, please contact your surgeon on call. Report the following to your surgeon:  · Excessive pain, swelling, redness or odor of or around the surgical area  · Temperature over 100.5  · Nausea and vomiting lasting longer than 4 hours or if unable to take medications  · Any signs of decreased circulation or nerve impairment to extremity: change in color, persistent  numbness, tingling, coldness or increase pain  · Any questions    What to do at Home:  Recommended activity: Activity as tolerated. If you experience any of the following symptoms chest pain, difficulty breathing, changes in mental status, increased weakness/numbness, syncopal episodes, bleeding please call 911. *  Please give a list of your current medications to your Primary Care Provider. *  Please update this list whenever your medications are discontinued, doses are      changed, or new medications (including over-the-counter products) are added. *  Please carry medication information at all times in case of emergency situations. These are general instructions for a healthy lifestyle:    No smoking/ No tobacco products/ Avoid exposure to second hand smoke  Surgeon General's Warning:  Quitting smoking now greatly reduces serious risk to your health.     Obesity, smoking, and sedentary lifestyle greatly increases your risk for illness    A healthy diet, regular physical exercise & weight monitoring are important for maintaining a healthy lifestyle    You may be retaining fluid if you have a history of heart failure or if you experience any of the following symptoms:  Weight gain of 3 pounds or more overnight or 5 pounds in a week, increased swelling in our hands or feet or shortness of breath while lying flat in bed. Please call your doctor as soon as you notice any of these symptoms; do not wait until your next office visit. Recognize signs and symptoms of STROKE:    F-face looks uneven    A-arms unable to move or move unevenly    S-speech slurred or non-existent    T-time-call 911 as soon as signs and symptoms begin-DO NOT go       Back to bed or wait to see if you get better-TIME IS BRAIN. Warning Signs of HEART ATTACK     Call 911 if you have these symptoms:   Chest discomfort. Most heart attacks involve discomfort in the center of the chest that lasts more than a few minutes, or that goes away and comes back. It can feel like uncomfortable pressure, squeezing, fullness, or pain.  Discomfort in other areas of the upper body. Symptoms can include pain or discomfort in one or both arms, the back, neck, jaw, or stomach.  Shortness of breath with or without chest discomfort.  Other signs may include breaking out in a cold sweat, nausea, or lightheadedness. Don't wait more than five minutes to call 911 - MINUTES MATTER! Fast action can save your life. Calling 911 is almost always the fastest way to get lifesaving treatment. Emergency Medical Services staff can begin treatment when they arrive -- up to an hour sooner than if someone gets to the hospital by car. The discharge information has been reviewed with the patient and spouse. The patient and spouse verbalized understanding. Discharge medications reviewed with the patient and spouse and appropriate educational materials and side effects teaching were provided.     Patient armband removed and shredded  ____________________________________________________________________________________________________________________    _______________

## 2017-12-09 NOTE — DISCHARGE SUMMARY
Discharge Summary     Patient ID:  Messi Davis  [id number]  74 y.o.  1950  Body mass index is 27.11 kg/(m^2). PCP on record: Charity Ware MD    Admit date: 12/8/2017  Discharge date and time: 12/15/2017    Discharge Diagnoses:                                           1 Syncope   2 CAD   3 Chronic smoking   4 suspected STEMI   5 Close head injury       Consults: Cardiology          Hospital Course by problems:    HPI per admitting MD   \" Messi Davis is a 74 y.o. male who presented to the ER 2y to STEMI. He mentions that he was working in his garage & likely passed out , doesn't remember the events   Says next thing he remembers is that he is in ambulance. Initial EKG showed STEMI - pt taken to the cath lab   He is a chronic smoker - smokes about 1 ppd , says he has a home gym & works out frequently. Also mentions that he has had 2 stents placed in the past - many years ago. ER eval - STEMI alert called   Hospitalist admit for further eval. \"      - Admitted with Syncope at home with close head injury   - Cath was done - as STEMI suspected - CATH -   - Patient had Syncope - CT head negative , per wife patient was behaving as if he had Acute CVA - no CNS deficit now - MRI brain - follow up results     - Patient is insisting going home , and offers no other option - he is aware of risk he is taking being treated as out patient . - patient will follow up with Dr Rehana Andrews and further management as out patient. - He is clearly advised \" Not to Drive or operate machinery till cleared by his PCP \" , patient's family in room with him               Patient seen and examined by me on discharge day.   Pertinent Findings:  Patient is Alert Awake and oriented   HEENT - NAD    RS - Clear , no rales no rhonchi   CVS - regular rhythm and rate acceptable    abd - benign, BS present , no Distension   EXT - no edema , no calf tenderness   Neuro - alert and awake , grossly motor and sensory intact       Significant Diagnostic Studies:  CT head     MRI Brain- No Acute CVA     Pertinent Lab Data:  No results for input(s): WBC, HGB, HCT, PLT, HGBEXT, HCTEXT, PLTEXT, HGBEXT, HCTEXT, PLTEXT in the last 72 hours. No results for input(s): NA, K, CL, CO2, GLU, BUN, CREA, CA, MG, PHOS, ALB, TBIL, SGOT, ALT, INR in the last 72 hours. No lab exists for component: Kit Just    DISCHARGE MEDICATIONS:   @  Discharge Medication List as of 2017  5:58 PM      CONTINUE these medications which have NOT CHANGED    Details   nitroglycerin (NITROLINGUAL) 400 mcg/spray spray 1 Spray by SubLINGual route every five (5) minutes as needed. , Print, Disp-1 Bottle, R-2      ticagrelor (BRILINTA) 90 mg tablet Take 1 Tab by mouth two (2) times a day., Print, Disp-60 Tab, R-0      aspirin delayed-release 81 mg tablet Take 81 mg by mouth daily. , Historical Med      rosuvastatin (CRESTOR) 10 mg tablet take 1 tablet by mouth once daily, Historical Med, R-0         STOP taking these medications       metoprolol succinate (TOPROL-XL) 25 mg XL tablet Comments:   Reason for Stopping:             BB - metoprolol on hold as requested by cardiology       My Recommended Diet, Activity, Wound Care, and follow-up labs are listed in the patient's Discharge Insturctions which I have personally completed and reviewed. Disposition:     [] Home with family     [] [facility] [place] PT/RN   [] SNF/NH   [] Inpatient Rehab/[facility]  Condition at Discharge:  Stable    Follow up with:   PCP : Kamran Rao MD      Please follow-up tests/labs that are still pendin.  None  2.    >30 minutes spent coordinating this discharge (review instructions/follow-up, prescriptions, preparing report for sign off)    Signed:  Peggy Kang MD  12/15/2017  12:55 PM

## 2017-12-09 NOTE — PROGRESS NOTES
[facility]  -  Progress Note      Patient: Marya Salvador MRN: [id number]  SSN: [id number]    YOB: 1950  Age: 74 y.o. Sex: male      Admit Date: 12/8/2017    Assessment:     Hospital Problems  Never Reviewed          Codes Class Noted POA    STEMI (ST elevation myocardial infarction) ([facility] [address] ICD-10-CM: I21.3  ICD-9-CM: 410.90  12/8/2017 Unknown            -Syncope concerning for dysrhythmia given lack of preceding symptoms. Presented to [place] [id number]. Initially hypotensive, hypothermic and bradycardic. Placed on levophed overnight. No further bradycardia. Now low normal BP. On BB as outpatient.  -Chest pain in setting of known CAD with ST depression in inferior leads and minor downsloping ST elevation in lead AVR with indeterminate troponin. STEMI code called and patient transferred to [facility] for urgent cath. Cath this admission revealed patent stents without new lesions. -CAD s/p PCI to RCA and LAD both with RADHA 7/13/2017. On ASA, brilinta, statin and BB as outpatient.  -Normal LV function by LV gram 7/2017.  -Dyslipidemia. On statin.  -Tobacco abuse.   -Etoh use.     Primary cardiologist Dr. Loyda Miranda. Plan:     Stable  There has been no arrhythmia noted, and his BP has remained in the 110-120 range for most of the admission. Patient is adamant about going home and states that he refuses to stay another day. Given this, patient can be sent home and patient is to follow up as outpatient with Dr. Sarah Livingston for consideration of additional testing, which may also include consideration of implantable monitor. Would recommend that he NOT resume his BB therapy. Subjective:     No new complaints. He states that he has been feeling well since admitted here, and he is insistent on wanting to go home, citing that he has several things he could be doing rather that being here. He denies any other symptoms. Telemetry has no evidence of arrhythmia noted.     Objective:      Patient Vitals for the past 8 hrs:   Temp Pulse Resp BP SpO2   12/09/17 0742 97.8 °F (36.6 °C) - - - -   12/09/17 0441 98.1 °F (36.7 °C) - - - -   12/09/17 0430 - 62 15 - 95 %   12/09/17 0400 98.1 °F (36.7 °C) 66 16 113/62 95 %   12/09/17 0330 - 64 15 - 96 %   12/09/17 0300 - 65 17 - 95 %         Patient Vitals for the past 96 hrs:   Weight   12/08/17 0121 85.7 kg (188 lb 15 oz)   12/08/17 0014 81.6 kg (180 lb)         Intake/Output Summary (Last 24 hours) at 12/09/17 1052  Last data filed at 12/09/17 0826   Gross per 24 hour   Intake              240 ml   Output              600 ml   Net             -360 ml       Physical Exam:  General:  alert, cooperative, no distress, appears stated age  Neck:  no JVD  Lungs:  clear to auscultation bilaterally  Heart:  regular rate and rhythm, S1, S2 normal, no murmur, click, rub or gallop  Extremities:  extremities normal, atraumatic, no cyanosis or edema    Data Review:     Labs: Results:       Chemistry Recent Labs      12/09/17 0225 12/08/17   0501   GLU  108*  98   NA  144  142   K  3.8  4.0   CL  113*  112*   CO2  25  24   BUN  9  13   CREA  0.75  0.88   CA  7.6*  7.1*   MG   --   2.1   PHOS   --   3.2   AGAP  6  6   BUCR  12  15   AP  44*  46   TP  5.6*  5.7*   ALB  3.1*  3.1*   GLOB  2.5  2.6   AGRAT  1.2  1.2      CBC w/Diff Recent Labs      12/09/17 0225 12/08/17   0501  12/08/17   0037   WBC  5.4  8.1  17.1*   RBC  3.75*  4.29*  4.21*   HGB  12.4*  14.1  14.2   HCT  35.9*  41.1  39.9   PLT  144  156  166   GRANS   --   78*   --    LYMPH   --   14*   --    EOS   --   1   --       Cardiac Enzymes Lab Results   Component Value Date/Time     (H) 12/08/2017 04:10 PM    CKMB 36.1 (H) 12/08/2017 04:10 PM    CKND1 3.6 12/08/2017 04:10 PM    TROIQ 0.17 (H) 12/08/2017 04:10 PM      Coagulation Recent Labs      12/08/17   0501   PTP  14.1   INR  1.1   APTT  25.6       Lipid Panel Lab Results   Component Value Date/Time    Cholesterol, total 93 12/09/2017 02:25 AM    HDL Cholesterol 43 12/09/2017 02:25 AM    LDL, calculated 33.4 12/09/2017 02:25 AM    VLDL, calculated 16.6 12/09/2017 02:25 AM    Triglyceride 83 12/09/2017 02:25 AM    CHOL/HDL Ratio 2.2 12/09/2017 02:25 AM      BNP No results found for: BNP, BNPP, XBNPT   Liver Enzymes Recent Labs      12/09/17   0225   TP  5.6*   ALB  3.1*   AP  44*   SGOT  32      Digoxin    Thyroid Studies Lab Results   Component Value Date/Time    TSH 1.17 12/08/2017 05:01 AM

## 2017-12-10 NOTE — ROUTINE PROCESS
1327 assumed care of pt after bedside verbal report was given by off going nurse, pt resting in bed awake, no acute distress noted, pt denies c/o pain, will monitor     0834 pt awake, eating breakfast, no distress or changes noted, will monitor     1052 pt seen by cardiology, advised Samuel Mtz that he was ready to go home today and would not be staying until Monday even if he was not discharged, pt stated that he would leave against medical advise if necessary    1227 pt eating lunch, no distress noted, will monitor    1506 escorted pt to MRI on portable telemetry monitoring, no distress    1549 escorted pt back to unit, no changes noted    1819 pt discharged to home, instructions given to pt on medications, follow up appointments, site care, opportunity given for questions

## 2017-12-11 ENCOUNTER — TELEPHONE (OUTPATIENT)
Dept: OTHER | Age: 67
End: 2017-12-11

## 2017-12-11 NOTE — TELEPHONE ENCOUNTER
Called pt to follow up on recent hospitalization here at [place].       Pt states he is doing great this am.  He was not given any new Rx on discharge and has an appt to follow with cardiology on 12/13 and with his PCP on 12/14    Radhames Gambino RN

## 2017-12-13 ENCOUNTER — OFFICE VISIT (OUTPATIENT)
Dept: CARDIOLOGY CLINIC | Age: 67
End: 2017-12-13

## 2017-12-13 VITALS
HEART RATE: 75 BPM | SYSTOLIC BLOOD PRESSURE: 132 MMHG | HEIGHT: 70 IN | DIASTOLIC BLOOD PRESSURE: 88 MMHG | OXYGEN SATURATION: 97 % | BODY MASS INDEX: 26.48 KG/M2 | WEIGHT: 185 LBS

## 2017-12-13 DIAGNOSIS — I25.10 CORONARY ARTERY DISEASE INVOLVING NATIVE CORONARY ARTERY OF NATIVE HEART WITHOUT ANGINA PECTORIS: Primary | ICD-10-CM

## 2017-12-13 NOTE — MR AVS SNAPSHOT
Visit Information Date & Time Provider Department Dept. Phone Encounter #  
 12/13/2017  8:20 AM Dione Love MD [facility] [address] [id number] Upcoming Health Maintenance Date Due Hepatitis C Screening 1950 DTaP/Tdap/Td series (1 - Tdap) 4/13/1973 FOBT Q 1 YEAR AGE 50-75 4/13/2002 ZOSTER VACCINE AGE 60> 2/13/2012 GLAUCOMA SCREENING Q2Y 4/13/2017 Pneumococcal 65+ Low/Medium Risk (1 of 2 - PCV13) 4/13/2017 MEDICARE YEARLY EXAM 4/13/2017 Allergies as of 12/13/2017  Review Complete On: 12/13/2017 By: Lul Easton No Known Allergies Current Immunizations  Never Reviewed Name Date Influenza Vaccine (Quad) PF 12/9/2017  5:49 PM  
  
 Not reviewed this visit You Were Diagnosed With   
  
 Codes Comments Coronary artery disease involving native coronary artery of native heart without angina pectoris    -  Primary ICD-10-CM: I25.10 ICD-9-CM: 414.01 Vitals BP Pulse Height(growth percentile) Weight(growth percentile) SpO2 BMI  
 132/88 (BP 1 Location: Left arm, BP Patient Position: Sitting) 75 5' 10\" (1.778 m) 185 lb (83.9 kg) 97% 26.54 kg/m2 Smoking Status Former Smoker Vitals History BMI and BSA Data Body Mass Index Body Surface Area  
 26.54 kg/m 2 2.04 m 2 Preferred Pharmacy Pharmacy Name Phone [address] [facility] [phone] Your Updated Medication List  
  
   
This list is accurate as of: 12/13/17  9:23 AM.  Always use your most recent med list.  
  
  
  
  
 aspirin delayed-release 81 mg tablet Take 81 mg by mouth daily. nitroglycerin 400 mcg/spray spray Commonly known as:  NITROLINGUAL  
1 [place] by SubLINGual route every five (5) minutes as needed. rosuvastatin 10 mg tablet Commonly known as:  CRESTOR  
take 1 tablet by mouth once daily  
  
 ticagrelor 90 mg tablet Commonly known as:  [facility] [employer] [employer] Take 1 Tab by mouth two (2) times a day. We Performed the Following AMB POC EKG ROUTINE W/ 12 LEADS, INTER & REP [60608 CPT(R)] Introducing [facility] SERVICES! Chelsey Remy introduces [facility] patient portal. Now you can access parts of your medical record, email your doctor's office, and request medication refills online. 1. In your internet browser, go to [email or web address] 2. Click on the First Time User? Click Here link in the Sign In box. You will see the New Member Sign Up page. 3. Enter your [facility] Access Code exactly as it appears below. You will not need to use this code after youve completed the sign-up process. If you do not sign up before the expiration date, you must request a new code. · [facility] Access Code: [email or web address] Expires: 3/9/2018  5:43 PM 
 
4. Enter the last four digits of your Social Security Number (xxxx) and Date of Birth (mm/dd/yyyy) as indicated and click Submit. You will be taken to the next sign-up page. 5. Create a [facility] ID. This will be your [facility] login ID and cannot be changed, so think of one that is secure and easy to remember. 6. Create a [facility] password. You can change your password at any time. 7. Enter your Password Reset Question and Answer. This can be used at a later time if you forget your password. 8. Enter your e-mail address. You will receive e-mail notification when new information is available in [address] 9. Click Sign Up. You can now view and download portions of your medical record. 10. Click the Download Summary menu link to download a portable copy of your medical information. If you have questions, please visit the Frequently Asked Questions section of the [facility] website. Remember, [facility] is NOT to be used for urgent needs. For medical emergencies, dial 911. Now available from your iPhone and Android! Please provide this summary of care documentation to your next provider. Your primary care clinician is listed as Judeen Severs. If you have any questions after today's visit, please call [phone].

## 2017-12-13 NOTE — PROGRESS NOTES
PATIENT NAME: Holger Smith         74 y.o.      1950              DATE:12/13/2017    REASON FOR VISIT: Syncope    HISTORY OF PRESENT ILLNESS: Hospitalized recently because of a syncopal episode. This occurred while he was in his garage standing the finish on a car. He apparently felt a very warm sensation throughout his body and quickly lost consciousness. It is not clear as to what the length of time of the lost consciousness was. Workup included cardiac catheterization and coronary arteriography. The stents were patent. Coronaries were patent. Had an MRI of the brain which was unremarkable. Patient had been experiencing some atypical chest pain prior to the event. This is been present for at least several days and was described as a rather constant left precordial discomfort. He is presently free of chest discomfort. Denies palpitation. Denies difficulty breathing. Question of EP studies versus loop recorder implantation was raised during the hospitalization. The patient has considered these options since discharge and has decided that he would prefer to defer these unless he should develop any further symptoms. PAST MEDICAL HISTORY:   Past Medical History:  05/17/2017: Cardiac treadmill stress test      Comment: At least intermediate risk. Positive EKG on                maximal EST. Chest pain resolved within 1 min                of recovery. ST changes persisted 5 mins of                recovery. Ex time 4 min 38 sec.     No date: Mixed hyperlipidemia    PAST SURGICAL HISTORY:   Past Surgical History:  7/13/2017: CARDIAC CATHETERIZATION      Comment:    7/13/2017: CORONARY ARTERY ANGIOGRAM      Comment:    7/13/2017: CORONARY STENT EA ADDL VESSEL      Comment:    7/13/2017: CORONARY STENT SINGLE W/PTCA      Comment:    No date: HX TONSILLECTOMY  7/13/2017: LV ANGIOGRAPHY      Comment:    7/13/2017: MODERATE SEDATION      Comment:        SOCIAL HISTORY:  Social History Marital status:              Spouse name:                       Years of education:                 Number of children:               Social History Main Topics    Smoking status: Former Smoker                                                                Packs/day: 1.50      Years: 40.00          Quit date: 1/7/2017    Smokeless status: Never Used                        Alcohol use: Yes               ALLERGIES:   No Known Allergies     CURRENT MEDICATIONS:   Current Outpatient Prescriptions:  nitroglycerin (NITROLINGUAL) 400 mcg/spray spray, 1 Clifford by SubLINGual route every five (5) minutes as needed. ticagrelor (BRILINTA) 90 mg tablet, Take 1 Tab by mouth two (2) times a day. aspirin delayed-release 81 mg tablet, Take 81 mg by mouth daily. rosuvastatin (CRESTOR) 10 mg tablet, take 1 tablet by mouth once daily    No current facility-administered medications for this visit. REVIEW of SYSTEMS:Cardiovascular ROS: History of present illness     PHYSICAL EXAMINATION:   /88 (BP 1 Location: Left arm, BP Patient Position: Sitting)  Pulse 75  Ht 5' 10\" (1.778 m)  Wt 83.9 kg (185 lb)  SpO2 97%  BMI 26.54 kg/m2  BP Readings from Last 3 Encounters:  12/13/17 : 132/88  12/09/17 : 119/60  08/18/17 : 128/82    Pulse Readings from Last 3 Encounters:  12/13/17 : 75  12/09/17 : 77  08/18/17 : 68    Wt Readings from Last 3 Encounters:  12/13/17 : 83.9 kg (185 lb)  12/08/17 : 85.7 kg (188 lb 15 oz)  12/09/17 : 85.7 kg (188 lb 15 oz)    Neck: No jugular venous distention. Carotid upstrokes 2+ without bruits. Chest: Clear to auscultation. Heart: PMI not palpable. Regular rhythm. No murmur or gallop. Abdomen: Nontender without masses or organomegaly. Extremities: No edema. Dorsalis pedis and posterior tibial pulses intact. EKG: Within normal limits    IMPRESSION:   Syncope of unknown etiology. Coronary artery disease, asymptomatic status post PTCA.   Patent coronary arteries    PLAN:  The patient would like to defer any further diagnostic testing regarding the syncope. He will proceed with either EP study or a loop recorder if the symptoms should recur    The diagnoses and plan were discussed with patient. All questions answered. Plan of care agreed to by all concerned. Nasra Vargas.  MD [place]       ,

## 2017-12-13 NOTE — PROGRESS NOTES
1. Have you been to the ER, urgent care clinic since your last visit? Hospitalized since your last visit? 12/8/17 - 12/9/17 syncope  2. Have you seen or consulted any other health care providers outside of the [address] [place] [address] since your last visit? Include any pap smears or colon screening.   no

## 2017-12-20 NOTE — PROCEDURES
[facility] CATH    Name:Aura BARROS  MR#: [id number]  : 1950  ACCOUNT #: [de-identified]   DATE OF SERVICE: 2017    INDICATION:  Acute coronary syndrome, ongoing chest pain. PROCEDURE NOTE:  The patient was transferred from [facility] here in the emergency. He was taken to catheterization laboratory, draped and prepared in usual aseptic precautions. Right femoral artery was cannulated without any complications. Using 6-Bermudian catheters, bilateral coronary angiography was done. This was followed by removing all the catheters and transferring the patient to ICU after putting a 6-Bermudian Angio-Seal.    HEMODYNAMICS:  Aortic pressure was 110/60 and was stable during the case. ANGIOGRAPHIC FINDINGS:  The left main is normal angiographically. LAD arising from the left main. It is a large vessel, giving rise to 2 diagonals and multiple septal perforators. LAD and its branches have no significant angiographic disease other than mild luminal irregularities. Previously deployed LAD stent in the proximal to mid segment is widely patent. Left circumflex artery arising from left main. It is also a large vessel, giving rise to 1 large obtuse marginal and a small second OM branch. The circumflex and its branches are without any significant angiographic disease. Right coronary artery arising from right coronary ostium. It is a large and dominant vessel, giving its usual branches divided into a PDA and a large posterolateral branch. There is ectasia noted throughout the course of the right coronary artery. Previously deployed stent in the proximal right coronary artery is widely patent. CONCLUSION:  Patent stent in the proximal left anterior descending artery and proximal right coronary artery from 2017. DISCUSSION, RECOMMENDATIONS:  His symptoms are atypical, and further workup and treatment to be considered.   I do not think coronary artery disease is causing his symptoms.       Ivonne Tee MD       BG / BRANDEN  D: 12/19/2017 15:43     T: 12/19/2017 21:08  JOB #: [id number]

## 2018-03-15 ENCOUNTER — TELEPHONE (OUTPATIENT)
Dept: CARDIOLOGY CLINIC | Age: 68
End: 2018-03-15

## 2018-04-23 ENCOUNTER — OFFICE VISIT (OUTPATIENT)
Dept: CARDIOLOGY CLINIC | Age: 68
End: 2018-04-23

## 2018-04-23 VITALS
OXYGEN SATURATION: 98 % | SYSTOLIC BLOOD PRESSURE: 118 MMHG | HEIGHT: 70 IN | BODY MASS INDEX: 26.34 KG/M2 | DIASTOLIC BLOOD PRESSURE: 78 MMHG | HEART RATE: 81 BPM | WEIGHT: 184 LBS

## 2018-04-23 DIAGNOSIS — I25.10 CORONARY ARTERY DISEASE INVOLVING NATIVE CORONARY ARTERY OF NATIVE HEART WITHOUT ANGINA PECTORIS: Primary | ICD-10-CM

## 2018-04-23 DIAGNOSIS — R55 VASO VAGAL EPISODE: ICD-10-CM

## 2018-04-23 DIAGNOSIS — E78.00 HYPERCHOLESTEREMIA: ICD-10-CM

## 2018-04-23 NOTE — MR AVS SNAPSHOT
[address] [address] [address] [address] [phone] Patient: Shanna Lindquist MRN: [id number] Visit Information Date & Time Provider Department Dept. Phone Encounter #  
 4/23/2018  3:20 PM Santiago Salcedo MD [facility] [address] [id number] Upcoming Health Maintenance Date Due Hepatitis C Screening 1950 DTaP/Tdap/Td series (1 - Tdap) 4/13/1973 FOBT Q 1 YEAR AGE 50-75 4/13/2002 ZOSTER VACCINE AGE 60> 2/13/2012 GLAUCOMA SCREENING Q2Y 4/13/2017 Pneumococcal 65+ Low/Medium Risk (1 of 2 - PCV13) 4/13/2017 Allergies as of 4/23/2018  Review Complete On: 4/23/2018 By: Santiago Salcedo MD  
 Not on File Current Immunizations  Never Reviewed Name Date Influenza Vaccine (Quad) PF 12/9/2017  5:49 PM  
  
 Not reviewed this visit You Were Diagnosed With   
  
 Codes Comments Coronary artery disease involving native coronary artery of native heart without angina pectoris    -  Primary ICD-10-CM: I25.10 ICD-9-CM: 414.01 Vitals BP Pulse Height(growth percentile) Weight(growth percentile) SpO2 BMI  
 118/78 (BP 1 Location: Left arm, BP Patient Position: Sitting) 81 5' 10\" (1.778 m) 184 lb (83.5 kg) 98% 26.4 kg/m2 Smoking Status Former Smoker Vitals History BMI and BSA Data Body Mass Index Body Surface Area  
 26.4 kg/m 2 2.03 m 2 Preferred Pharmacy Pharmacy Name Phone Pamella Neal, [facility] [phone] Your Updated Medication List  
  
   
This list is accurate as of 4/23/18  4:02 PM.  Always use your most recent med list.  
  
  
  
  
 aspirin delayed-release 81 mg tablet Take 81 mg by mouth daily. nitroglycerin 400 mcg/spray spray Commonly known as:  NITROLINGUAL  
1 [place] by SubLINGual route every five (5) minutes as needed. rosuvastatin 10 mg tablet Commonly known as:  CRESTOR  
take 1 tablet by mouth once daily  
  
 ticagrelor 90 mg tablet Commonly known as:  [employer] Take 1 Tab by mouth two (2) times a day. We Performed the Following AMB POC EKG ROUTINE W/ 12 LEADS, INTER & REP [57510 CPT(R)] Introducing [facility] SERVICES! Dear Gurjit Salvador: Thank you for requesting a [employer] account. Our records indicate that you already have an active [employer] account. You can access your account anytime at [email or web address] Did you know that you can access your hospital and ER discharge instructions at any time in [employer]? You can also review all of your test results from your hospital stay or ER visit. Additional Information If you have questions, please visit the Frequently Asked Questions section of the [employer] website at [email or web address] Remember, [facility] is NOT to be used for urgent needs. For medical emergencies, dial 911. Now available from your iPhone and Android! Please provide this summary of care documentation to your next provider. Your primary care clinician is listed as Wyatt Mckeon. If you have any questions after today's visit, please call [phone].

## 2018-04-23 NOTE — PROGRESS NOTES
HISTORY OF PRESENT ILLNESS  Toro Cage is a 79 y.o. male. HPI  This is a former patient of the late Dr. Clementine Santana. He came to the office for first follow-up appointment with me. He has been doing very well. He denies any cardiac symptoms. He has remained very active. He is still actively practicing boxing. He denies chest pain, dyspnea, orthopnea or PND. He denies palpitations, dizziness or syncope. He has no symptoms to indicate TIA or amaurosis fugax. He has a history of stenting of the RCA in the early part of 2017 successfully. He had an episode of syncope in December of 2017. This occurred while he was in his garage working on his Aneumed car. He suddenly felt a very warm sensation and diaphoresis and quickly lost consciousness. There was no preceding chest pain or shortness of breath. There were no palpitations. He was taken to the emergency room where he was taken to the cardiac cath lab and an emergent cardiac catheterization was performed on 12/8/2017 which demonstrated:  1. Patent left main trunk. 2. Patent LAD. 3. Patent ectatic RCA with a patent stented segment. He was found to have normal LV function by echocardiogram with the EF in the 55% range. He is a former smoker until 2017 for 40 years. He has a history of hypercholesterolemia. He has no history of diabetes. He has been on Crestor 10 mg for cholesterol. Review of Systems   Constitutional: Negative for malaise/fatigue and weight loss. HENT: Negative for hearing loss. Eyes: Negative for blurred vision and double vision. Respiratory: Negative for shortness of breath. Cardiovascular: Negative for chest pain, palpitations, orthopnea, claudication and PND. Gastrointestinal: Negative for blood in stool, heartburn and melena. Genitourinary: Negative for dysuria, frequency, hematuria and urgency. Musculoskeletal: Negative for back pain and joint pain.    Skin: Negative for itching and rash.   Neurological: Negative for dizziness, loss of consciousness and weakness. Psychiatric/Behavioral: Negative for depression and memory loss. Physical Exam   Constitutional: He is oriented to person, place, and time. He appears well-developed and well-nourished. HENT:   Head: Normocephalic and atraumatic. Eyes: Pupils are equal, round, and reactive to light. Neck: Normal range of motion. Neck supple. No JVD present. Cardiovascular: Normal rate, regular rhythm, S1 normal and S2 normal.   No extrasystoles are present. PMI is not displaced. Exam reveals no gallop and no friction rub. No murmur heard. Pulses:       Carotid pulses are 3+ on the right side, and 3+ on the left side. Pulmonary/Chest: Effort normal. He has no rales. Abdominal: Soft. There is no tenderness. Musculoskeletal: He exhibits no edema. Neurological: He is alert and oriented to person, place, and time. No cranial nerve deficit. Skin: Skin is warm and dry. Psychiatric: He has a normal mood and affect. His behavior is normal.     Visit Vitals    /78 (BP 1 Location: Left arm, BP Patient Position: Sitting)    Pulse 81    Ht 5' 10\" (1.778 m)    Wt 83.5 kg (184 lb)    SpO2 98%    BMI 26.4 kg/m2       Past Medical History:   Diagnosis Date    Cardiac treadmill stress test 05/17/2017    At least intermediate risk. Positive EKG on maximal EST. Chest pain resolved within 1 min of recovery. ST changes persisted 5 mins of recovery. Ex time 4 min 38 sec.  Mixed hyperlipidemia        Social History     Social History    Marital status:      Spouse name: N/A    Number of children: N/A    Years of education: N/A     Occupational History    Not on file.      Social History Main Topics    Smoking status: Former Smoker     Packs/day: 1.50     Years: 40.00     Quit date: 1/7/2017    Smokeless tobacco: Never Used    Alcohol use Yes    Drug use: Not on file    Sexual activity: Not on file     Other Topics Concern    Not on file     Social History Narrative       Family History   Problem Relation Age of Onset    Heart Attack Father     Coronary Artery Disease Father        Past Surgical History:   Procedure Laterality Date    CARDIAC CATHETERIZATION  7/13/2017         CORONARY ARTERY ANGIOGRAM  7/13/2017         CORONARY STENT EA ADDL VESSEL  7/13/2017         CORONARY STENT SINGLE W/PTCA  7/13/2017         HX TONSILLECTOMY      LV ANGIOGRAPHY  7/13/2017         MODERATE SEDATION  7/13/2017            Current Outpatient Prescriptions   Medication Sig Dispense Refill    nitroglycerin (NITROLINGUAL) 400 mcg/spray spray 1 Spray by SubLINGual route every five (5) minutes as needed. 1 Bottle 2    ticagrelor (BRILINTA) 90 mg tablet Take 1 Tab by mouth two (2) times a day. 60 Tab 0    aspirin delayed-release 81 mg tablet Take 81 mg by mouth daily.  rosuvastatin (CRESTOR) 10 mg tablet take 1 tablet by mouth once daily  0       EKG: normal EKG, normal sinus rhythm, unchanged from previous tracings, incomplete RBBB  . ASSESSMENT and PLAN  Encounter Diagnoses   Name Primary?  Coronary artery disease involving native coronary artery of native heart without angina pectoris,s/p stent RCA  %/2017,EF55% Yes    Vaso vagal episode     Hypercholesteremia    Cardiacwise he has been doing well. He has had no symptoms to indicate angina or cardiac decompensation. He has had no major cardiac arrhythmia. His syncope in December of 2017 seems to be a case of vagal episode and this was explained to the patient. He was advised to sit down or lie down if he feels an episode coming on. Also, he was advised to force himself to cough. From a cardiac point of view, he will be continued on current medical regimen for now.

## 2018-04-23 NOTE — PROGRESS NOTES
1. Have you been to the ER, urgent care clinic since your last visit? Hospitalized since your last visit? No    2. Have you seen or consulted any other health care providers outside of the [address] [place] [address] since your last visit? Include any pap smears or colon screening.  No

## 2018-05-11 ENCOUNTER — TELEPHONE (OUTPATIENT)
Dept: CARDIOLOGY CLINIC | Age: 68
End: 2018-05-11

## 2018-12-17 ENCOUNTER — OFFICE VISIT (OUTPATIENT)
Dept: CARDIOLOGY CLINIC | Age: 68
End: 2018-12-17

## 2018-12-17 VITALS
HEIGHT: 70 IN | SYSTOLIC BLOOD PRESSURE: 120 MMHG | OXYGEN SATURATION: 98 % | HEART RATE: 77 BPM | DIASTOLIC BLOOD PRESSURE: 90 MMHG | BODY MASS INDEX: 26.34 KG/M2 | WEIGHT: 184 LBS

## 2018-12-17 DIAGNOSIS — I25.10 CORONARY ARTERY DISEASE INVOLVING NATIVE CORONARY ARTERY OF NATIVE HEART WITHOUT ANGINA PECTORIS: Primary | ICD-10-CM

## 2018-12-17 DIAGNOSIS — E78.00 HYPERCHOLESTEREMIA: ICD-10-CM

## 2018-12-17 DIAGNOSIS — R55 VASO VAGAL EPISODE: ICD-10-CM

## 2018-12-17 NOTE — PROGRESS NOTES
HISTORY OF PRESENT ILLNESS  Benja Dale is a 79 y.o. male. HPI  He has been doing very well. He has been very active teaching boxing class. He denies chest pain, dyspnea, orthopnea, PND. He denies palpitation, dizziness or syncope. He denies any symptoms of TIA or amaurosis fugax. His blood pressure has been well controlled. He has a history of stenting of the RCA in the early part of 2017 successfully. He had an episode of syncope in December of 2017. This occurred while he was in his garage working on his [employer] car. He suddenly felt a very warm sensation and diaphoresis and quickly lost consciousness. There was no preceding chest pain or shortness of breath. There were no palpitations. He was taken to the emergency room where he was taken to the cardiac cath lab and an emergent cardiac catheterization was performed on 12/8/2017 which demonstrated:  1. Patent left main trunk. 2. Patent LAD. 3. Patent ectatic RCA with a patent stented segment.       He was found to have normal LV function by echocardiogram with the EF in the 55% range.       He is a former smoker until 2017 for 40 years. He has a history of hypercholesterolemia. He has no history of diabetes. He has been on Crestor 10 mg for cholesterol. Review of Systems   Constitutional: Negative for malaise/fatigue and weight loss. HENT: Negative for hearing loss. Eyes: Negative for blurred vision and double vision. Respiratory: Negative for shortness of breath. Cardiovascular: Negative for chest pain, palpitations, orthopnea, claudication, leg swelling and PND. Gastrointestinal: Negative for blood in stool, heartburn and melena. Genitourinary: Negative for dysuria, frequency, hematuria and urgency. Musculoskeletal: Negative for back pain and joint pain. Skin: Negative for itching and rash. Neurological: Negative for dizziness, loss of consciousness and weakness.    Psychiatric/Behavioral: Negative for depression and memory loss. Physical Exam   Constitutional: He is oriented to person, place, and time. He appears well-developed and well-nourished. HENT:   Head: Normocephalic and atraumatic. Eyes: Conjunctivae are normal. Pupils are equal, round, and reactive to light. Neck: Normal range of motion. Neck supple. No JVD present. Cardiovascular: Normal rate, regular rhythm, S1 normal and S2 normal.  No extrasystoles are present. PMI is not displaced. Exam reveals no gallop and no friction rub. No murmur heard. Pulses:       Carotid pulses are 3+ on the right side, and 3+ on the left side. Pulmonary/Chest: Effort normal. He has no rales. Abdominal: Soft. There is no tenderness. Musculoskeletal: He exhibits no edema. Neurological: He is alert and oriented to person, place, and time. No cranial nerve deficit. Skin: Skin is warm and dry. Psychiatric: He has a normal mood and affect. His behavior is normal.     Visit Vitals  /90   Pulse 77   Ht 5' 10\" (1.778 m)   Wt 83.5 kg (184 lb)   SpO2 98%   BMI 26.40 kg/m²       Past Medical History:   Diagnosis Date    Cardiac treadmill stress test 05/17/2017    At least intermediate risk. Positive EKG on maximal EST. Chest pain resolved within 1 min of recovery. ST changes persisted 5 mins of recovery. Ex time 4 min 38 sec.       Mixed hyperlipidemia        Social History     Socioeconomic History    Marital status:      Spouse name: Not on file    Number of children: Not on file    Years of education: Not on file    Highest education level: Not on file   Social Needs    Financial resource strain: Not on file    Food insecurity - worry: Not on file    Food insecurity - inability: Not on file    Transportation needs - medical: Not on file   Gertrude needs - non-medical: Not on file   Occupational History    Not on file   Tobacco Use    Smoking status: Former Smoker     Packs/day: 1.50     Years: 40.00     Pack years: 60.00     Last attempt to quit: 2017     Years since quittin.9    Smokeless tobacco: Never Used   Substance and Sexual Activity    Alcohol use: Yes    Drug use: Not on file    Sexual activity: Not on file   Other Topics Concern    Not on file   Social History Narrative    Not on file       Family History   Problem Relation Age of Onset    Heart Attack Father     Coronary Artery Disease Father        Past Surgical History:   Procedure Laterality Date    CARDIAC CATHETERIZATION  2017         CORONARY ARTERY ANGIOGRAM  2017         CORONARY STENT EA ADDL VESSEL  2017         CORONARY STENT SINGLE W/PTCA  2017         HX TONSILLECTOMY      LV ANGIOGRAPHY  2017         MODERATE SEDATION  2017            Current Outpatient Medications   Medication Sig Dispense Refill    ticagrelor (BRILINTA) 90 mg tablet Take 1 Tab by mouth two (2) times a day. 180 Tab 3    nitroglycerin (NITROLINGUAL) 400 mcg/spray spray 1 Spray by SubLINGual route every five (5) minutes as needed. 1 Bottle 2    aspirin delayed-release 81 mg tablet Take 81 mg by mouth daily.  rosuvastatin (CRESTOR) 10 mg tablet take 1 tablet by mouth once daily  0       EKG: normal EKG, normal sinus rhythm, unchanged from previous tracings, incomplete RBBB  . ASSESSMENT and PLAN  Encounter Diagnoses   Name Primary?  Coronary artery disease involving native coronary artery of native heart without angina pectoris Yes    Comment: s/p stent RCA  /,EF55%    Vaso vagal episode     Hypercholesteremia    He has been doing very well. He has had no symptoms to indicate angina or cardiac decompensation. He has not had any recurrent syncope which was secondary to vagal episode. His blood pressure has been under control. He has been active teaching boxing. For now, continue on current medical regimen.

## 2018-12-17 NOTE — PROGRESS NOTES
Guicho Weller presents today for   Chief Complaint   Patient presents with    Follow-up     6 month - no cardiac complaints       Guicho Weller preferred language for health care discussion is english/other. Is someone accompanying this pt? No    Is the patient using any DME equipment during OV? No    Depression Screening:  PHQ over the last two weeks 4/23/2018   Little interest or pleasure in doing things Not at all   Feeling down, depressed, irritable, or hopeless Not at all   Total Score PHQ 2 0       Learning Assessment:  Learning Assessment 7/7/2017   PRIMARY LEARNER Patient   HIGHEST LEVEL OF EDUCATION - PRIMARY LEARNER  GRADUATED HIGH SCHOOL OR GED   BARRIERS PRIMARY LEARNER NONE   CO-LEARNER CAREGIVER No   PRIMARY LANGUAGE ENGLISH    NEED No   LEARNER PREFERENCE PRIMARY READING   ANSWERED BY patient   RELATIONSHIP SELF       Abuse Screening:  Abuse Screening Questionnaire 4/23/2018   Do you ever feel afraid of your partner? N   Are you in a relationship with someone who physically or mentally threatens you? N   Is it safe for you to go home? Y       Fall Risk  Fall Risk Assessment, last 12 mths 4/23/2018   Able to walk? Yes   Fall in past 12 months? No       Pt currently taking Antiplatelet therapy? Brilinta and ASA    Coordination of Care:  1. Have you been to the ER, urgent care clinic since your last visit? Hospitalized since your last visit? No    2. Have you seen or consulted any other health care providers outside of the [address] [place] [address] since your last visit? Include any pap smears or colon screening.  No

## 2019-05-14 NOTE — PATIENT INSTRUCTIONS
Instructions    Patients Name:  Rayvon Duane    1. You are scheduled to have a left heart catherization on 7/13/2017  at 9:15am Please check in at 8:15am  .     2. Please go to [facility] and park in the outpatient parking lot that is located around to the back of the hospital and enter through the [facility] building. Once you enter through the [facility] check in with the  there. The  will either give you directions or assist you in getting to the cath holding area. 3. You are not to eat anything after midnight the morning of the procedure. Please continue to drink fluids up until 6am.  (water, juice, black coffee, soft drinks). Do not drink milk or milk products or anything with cream.    4. If you are diabetic, do not take your insulin/sugar pill the morning of the procedure. 5. MEDICATION INSTRUCTIONS:   Please take your morning medications with the following special instructions:    [x]          Take your Aspirin and/or Plavix. 6. We encourage families to wait in the waiting room on the first floor while the procedure is being done. The Doctor will come out and talk with you as soon as the procedure is over. 7. There is the possibility that you may spend the night in the hospital, depending on the results of the procedure. This will be determined after the procedure is done. If angioplasty or stent is planned, you will stay at least one day. 8. If you or your family have any questions, please call our office Monday -Friday, 9:00 a.m.-4:30 p.m.,  At [phone], and ask to speak to one of the nurses. c reaction to the dye.
Attending Attestation (For Attendings USE Only)...

## 2019-06-17 ENCOUNTER — OFFICE VISIT (OUTPATIENT)
Dept: CARDIOLOGY CLINIC | Age: 69
End: 2019-06-17

## 2019-06-17 VITALS
HEART RATE: 78 BPM | BODY MASS INDEX: 26.2 KG/M2 | DIASTOLIC BLOOD PRESSURE: 82 MMHG | HEIGHT: 70 IN | WEIGHT: 183 LBS | OXYGEN SATURATION: 98 % | SYSTOLIC BLOOD PRESSURE: 112 MMHG

## 2019-06-17 DIAGNOSIS — R55 VASO VAGAL EPISODE: ICD-10-CM

## 2019-06-17 DIAGNOSIS — I25.10 CORONARY ARTERY DISEASE INVOLVING NATIVE CORONARY ARTERY OF NATIVE HEART WITHOUT ANGINA PECTORIS: Primary | ICD-10-CM

## 2019-06-17 DIAGNOSIS — E78.00 HYPERCHOLESTEREMIA: ICD-10-CM

## 2019-06-17 NOTE — PROGRESS NOTES
Boaz Saavedra presents today for   Chief Complaint   Patient presents with    Coronary Artery Disease     6 MONTH - no cardiac complaints       Boaz Saavedra preferred language for health care discussion is english/other. Is someone accompanying this pt? no    Is the patient using any DME equipment during [address]? no    Depression Screening:  3 most recent PHQ Screens 4/23/2018   Little interest or pleasure in doing things Not at all   Feeling down, depressed, irritable, or hopeless Not at all   Total Score PHQ 2 0       Learning Assessment:  Learning Assessment 7/7/2017   PRIMARY LEARNER Patient   HIGHEST LEVEL OF EDUCATION - PRIMARY LEARNER  GRADUATED HIGH SCHOOL OR GED   BARRIERS PRIMARY LEARNER NONE   CO-LEARNER CAREGIVER No   PRIMARY LANGUAGE ENGLISH    NEED No   LEARNER PREFERENCE PRIMARY READING   ANSWERED BY patient   RELATIONSHIP SELF       Abuse Screening:  Abuse Screening Questionnaire 4/23/2018   Do you ever feel afraid of your partner? N   Are you in a relationship with someone who physically or mentally threatens you? N   Is it safe for you to go home? Y       Fall Risk  Fall Risk Assessment, last 12 mths 4/23/2018   Able to walk? Yes   Fall in past 12 months? No       Pt currently taking Anticoagulant therapy? yes    Coordination of Care:  1. Have you been to the ER, urgent care clinic since your last visit? Hospitalized since your last visit? no    2. Have you seen or consulted any other health care providers outside of the [address] [place] [address] since your last visit? Include any pap smears or colon screening.  no

## 2019-06-17 NOTE — PROGRESS NOTES
HISTORY OF PRESENT ILLNESS  Crista Quigley is a 81 y.o. male. HPI  He has been doing very well. He has had no chest pain, dyspnea, orthopnea, PND. He denies palpitations, dizziness or syncope. He has had no symptoms to indicate TIA or amaurosis fugax. He has been very active. He has a history of stenting of the RCA in the early part of 2017 successfully. [facility] had an episode of syncope in December of 2017.  This occurred while he was in his garage working on his antique car. [facility] suddenly felt a very warm sensation and diaphoresis and quickly lost consciousness. Loann Ball was no preceding chest pain or shortness of breath.  There were no palpitations.  He was taken to the emergency room where he was taken to the cardiac cath lab and an emergent cardiac catheterization was performed on 12/8/2017 which demonstrated:  1. Patent left main trunk.    2. Patent LAD. 3. Patent ectatic RCA with a patent stented segment.       He was found to have normal LV function by echocardiogram with the EF in the 55% range.       He is a former smoker until 2017 for 40 years. [facility] has a history of hypercholesterolemia. [facility] has no history of diabetes. [facility] has been on Crestor 10 mg for cholesterol.      Review of Systems   Constitutional: Negative for malaise/fatigue and weight loss. HENT: Negative for hearing loss. Eyes: Negative for blurred vision and double vision. Respiratory: Negative for shortness of breath. Cardiovascular: Negative for chest pain, palpitations, orthopnea, claudication, leg swelling and PND. Gastrointestinal: Negative for blood in stool, heartburn and melena. Genitourinary: Negative for dysuria, frequency, hematuria and urgency. Musculoskeletal: Negative for back pain and joint pain. Skin: Negative for itching and rash. Neurological: Negative for dizziness and loss of consciousness. Psychiatric/Behavioral: Negative for depression and memory loss.        Physical Exam   Constitutional: He is oriented to person, place, and time. He appears well-developed and well-nourished. HENT:   Head: Normocephalic and atraumatic. Eyes: Pupils are equal, round, and reactive to light. Conjunctivae are normal.   Neck: Normal range of motion. Neck supple. No JVD present. Cardiovascular: Normal rate, regular rhythm, S1 normal and S2 normal.  No extrasystoles are present. PMI is not displaced. Exam reveals no gallop and no friction rub. No murmur heard. Pulses:       Carotid pulses are 3+ on the right side, and 3+ on the left side. Pulmonary/Chest: Effort normal. He has no rales. Abdominal: Soft. There is no tenderness. Musculoskeletal: He exhibits no edema. Neurological: He is alert and oriented to person, place, and time. No cranial nerve deficit. Skin: Skin is warm and dry. Psychiatric: He has a normal mood and affect. His behavior is normal.     Visit Vitals  /82   Pulse 78   Ht 5' 10\" (1.778 m)   Wt 83 kg (183 lb)   SpO2 98%   BMI 26.26 kg/m²       Past Medical History:   Diagnosis Date    Cardiac treadmill stress test 2017    At least intermediate risk. Positive EKG on maximal EST. Chest pain resolved within 1 min of recovery. ST changes persisted 5 mins of recovery. Ex time 4 min 38 sec.       Mixed hyperlipidemia        Social History     Socioeconomic History    Marital status:      Spouse name: Not on file    Number of children: Not on file    Years of education: Not on file    Highest education level: Not on file   Occupational History    Not on file   Social Needs    Financial resource strain: Not on file    Food insecurity:     Worry: Not on file     Inability: Not on file    Transportation needs:     Medical: Not on file     Non-medical: Not on file   Tobacco Use    Smoking status: Former Smoker     Packs/day: 1.50     Years: 40.00     Pack years: 60.00     Last attempt to quit: 2017     Years since quittin.4    Smokeless tobacco: Never Used Substance and Sexual Activity    Alcohol use: Yes    Drug use: Not on file    Sexual activity: Not on file   Lifestyle    Physical activity:     Days per week: Not on file     Minutes per session: Not on file    Stress: Not on file   Relationships    Social connections:     Talks on phone: Not on file     Gets together: Not on file     Attends Religion service: Not on file     Active member of club or organization: Not on file     Attends meetings of clubs or organizations: Not on file     Relationship status: Not on file    Intimate partner violence:     Fear of current or ex partner: Not on file     Emotionally abused: Not on file     Physically abused: Not on file     Forced sexual activity: Not on file   Other Topics Concern    Not on file   Social History Narrative    Not on file       Family History   Problem Relation Age of Onset    Heart Attack Father     Coronary Artery Disease Father        Past Surgical History:   Procedure Laterality Date    CARDIAC CATHETERIZATION  7/13/2017         CORONARY ARTERY ANGIOGRAM  7/13/2017         CORONARY STENT EA ADDL VESSEL  7/13/2017         CORONARY STENT SINGLE W/PTCA  7/13/2017         HX TONSILLECTOMY      LV ANGIOGRAPHY  7/13/2017         MODERATE SEDATION  7/13/2017            Current Outpatient Medications   Medication Sig Dispense Refill    ticagrelor (BRILINTA) 90 mg tablet Take 1 Tab by mouth two (2) times a day. 180 Tab 3    nitroglycerin (NITROLINGUAL) 400 mcg/spray spray 1 Spray by SubLINGual route every five (5) minutes as needed. 1 Bottle 2    aspirin delayed-release 81 mg tablet Take 81 mg by mouth daily.  rosuvastatin (CRESTOR) 10 mg tablet take 1 tablet by mouth once daily  0       EKG: normal EKG, normal sinus rhythm, unchanged from previous tracings, incomplete RBBB  . ASSESSMENT and PLAN  Encounter Diagnoses   Name Primary?     Coronary artery disease involving native coronary artery of native heart without angina pectoris. s/p stent RCA  %/2017,EF55% Yes    Vaso vagal episode     Hypercholesteremia    He has been doing very well. He has had no symptoms to indicate angina or cardiac decompensation. He has been very active with no difficulties. For now, he will be continued on current medical regimen. He will have follow up stress testing in six months.

## 2019-08-26 ENCOUNTER — HOSPITAL ENCOUNTER (OUTPATIENT)
Dept: NON INVASIVE DIAGNOSTICS | Age: 69
Discharge: HOME | End: 2019-08-26
Attending: INTERNAL MEDICINE
Payer: COMMERCIAL

## 2019-08-26 VITALS
BODY MASS INDEX: 26.2 KG/M2 | SYSTOLIC BLOOD PRESSURE: 120 MMHG | WEIGHT: 183 LBS | DIASTOLIC BLOOD PRESSURE: 80 MMHG | HEIGHT: 70 IN

## 2019-08-26 DIAGNOSIS — I25.10 CORONARY ARTERY DISEASE INVOLVING NATIVE CORONARY ARTERY OF NATIVE HEART WITHOUT ANGINA PECTORIS: ICD-10-CM

## 2019-08-26 PROCEDURE — 74011250636 HC RX REV CODE- 250/636: Performed by: INTERNAL MEDICINE

## 2019-08-26 PROCEDURE — 93017 CV STRESS TEST TRACING ONLY: CPT

## 2019-08-26 RX ORDER — SODIUM CHLORIDE 9 MG/ML
100 INJECTION, SOLUTION INTRAVENOUS ONCE
Status: COMPLETED | OUTPATIENT
Start: 2019-08-26 | End: 2019-08-26

## 2019-08-26 RX ORDER — SODIUM CHLORIDE 0.9 % (FLUSH) 0.9 %
10 SYRINGE (ML) INJECTION AS NEEDED
Status: COMPLETED | OUTPATIENT
Start: 2019-08-26 | End: 2019-08-26

## 2019-08-26 RX ADMIN — SODIUM CHLORIDE 100 ML/HR: 900 INJECTION, SOLUTION INTRAVENOUS at 09:45

## 2019-08-26 RX ADMIN — Medication 10 ML: at 08:25

## 2019-08-26 NOTE — PROGRESS NOTES
Patient was injected with 79.3 millicuries 75QRA Sestamibi on 8/26/19 at [facility]. Patient was injected with 75.8 millicuries 04SOX Sestamibi on 8/26/19 at 0945. Patient's armbands were removed and placed in shred-it box.     Patient had a Nuclear Treadmill Stress Test.

## 2019-08-27 ENCOUNTER — TELEPHONE (OUTPATIENT)
Dept: CARDIOLOGY CLINIC | Age: 69
End: 2019-08-27

## 2019-09-10 LAB
STRESS ANGINA INDEX: 0
STRESS BASELINE DIAS BP: 80 MMHG
STRESS BASELINE HR: 64 BPM
STRESS BASELINE SYS BP: 120 MMHG
STRESS ESTIMATED WORKLOAD: 7 METS
STRESS EXERCISE DUR MIN: NORMAL
STRESS PEAK DIAS BP: 82 MMHG
STRESS PEAK SYS BP: 180 MMHG
STRESS PERCENT HR ACHIEVED: 94 %
STRESS POST PEAK HR: 144 BPM
STRESS RATE PRESSURE PRODUCT: NORMAL BPM*MMHG
STRESS SR DUKE TREADMILL SCORE: 0
STRESS ST DEPRESSION: 0 MM
STRESS ST ELEVATION: 0 MM
STRESS TARGET HR: 153 BPM

## 2019-09-11 ENCOUNTER — TELEPHONE (OUTPATIENT)
Dept: CARDIOLOGY CLINIC | Age: 69
End: 2019-09-11

## 2022-02-11 ENCOUNTER — OFFICE VISIT (OUTPATIENT)
Dept: ORTHOPEDIC SURGERY | Age: 72
End: 2022-02-11
Payer: MEDICARE

## 2022-02-11 VITALS
WEIGHT: 184.4 LBS | BODY MASS INDEX: 26.4 KG/M2 | OXYGEN SATURATION: 98 % | TEMPERATURE: 97.8 F | HEART RATE: 75 BPM | HEIGHT: 70 IN

## 2022-02-11 DIAGNOSIS — M65.341 ACQUIRED TRIGGER FINGER OF RIGHT RING FINGER: Primary | ICD-10-CM

## 2022-02-11 PROCEDURE — 1101F PT FALLS ASSESS-DOCD LE1/YR: CPT | Performed by: ORTHOPAEDIC SURGERY

## 2022-02-11 PROCEDURE — G8427 DOCREV CUR MEDS BY ELIG CLIN: HCPCS | Performed by: ORTHOPAEDIC SURGERY

## 2022-02-11 PROCEDURE — 20550 NJX 1 TENDON SHEATH/LIGAMENT: CPT | Performed by: ORTHOPAEDIC SURGERY

## 2022-02-11 PROCEDURE — G8432 DEP SCR NOT DOC, RNG: HCPCS | Performed by: ORTHOPAEDIC SURGERY

## 2022-02-11 PROCEDURE — 3017F COLORECTAL CA SCREEN DOC REV: CPT | Performed by: ORTHOPAEDIC SURGERY

## 2022-02-11 PROCEDURE — 99203 OFFICE O/P NEW LOW 30 MIN: CPT | Performed by: ORTHOPAEDIC SURGERY

## 2022-02-11 PROCEDURE — G8536 NO DOC ELDER MAL SCRN: HCPCS | Performed by: ORTHOPAEDIC SURGERY

## 2022-02-11 PROCEDURE — G8419 CALC BMI OUT NRM PARAM NOF/U: HCPCS | Performed by: ORTHOPAEDIC SURGERY

## 2022-02-11 NOTE — PROGRESS NOTES
Ari Comer is a 73 y.o. male right handed . Worker's Compensation and legal considerations: none filed. Vitals:    02/11/22 1036   Pulse: 75   Temp: 97.8 °F (36.6 °C)   TempSrc: Temporal   SpO2: 98%   Weight: 184 lb 6.4 oz (83.6 kg)   Height: 5' 10\" (1.778 m)   PainSc:   8   PainLoc: Hand           Chief Complaint   Patient presents with    Hand Pain     right         HPI: Right ring finger pain and locking    Date of onset: Indeterminate    Injury: No    Prior Treatment:  No    Numbness/ Tingling: No      ROS: Review of Systems - General ROS: negative  Psychological ROS: negative  ENT ROS: negative  Allergy and Immunology ROS: negative  Hematological and Lymphatic ROS: negative  Respiratory ROS: no cough, shortness of breath, or wheezing  Cardiovascular ROS: no chest pain or dyspnea on exertion  Gastrointestinal ROS: no abdominal pain, change in bowel habits, or black or bloody stools  Musculoskeletal ROS: negative  Neurological ROS: negative  Dermatological ROS: negative    Past Medical History:   Diagnosis Date    CAD (coronary artery disease)     Cardiac treadmill stress test 05/17/2017    At least intermediate risk. Positive EKG on maximal EST. Chest pain resolved within 1 min of recovery. ST changes persisted 5 mins of recovery. Ex time 4 min 38 sec.  Mixed hyperlipidemia        Past Surgical History:   Procedure Laterality Date    CARDIAC CATHETERIZATION  7/13/2017         CORONARY ARTERY ANGIOGRAM  7/13/2017         CORONARY STENT EA ADDL VESSEL  7/13/2017         CORONARY STENT SINGLE W/PTCA  7/13/2017         HX TONSILLECTOMY      LV ANGIOGRAPHY  7/13/2017         MODERATE SEDATION  7/13/2017            Current Outpatient Medications   Medication Sig Dispense Refill    aspirin delayed-release 81 mg tablet Take 81 mg by mouth daily.       rosuvastatin (CRESTOR) 10 mg tablet take 1 tablet by mouth once daily  0    ticagrelor (BRILINTA) 90 mg tablet Take 1 Tab by mouth two (2) times a day. (Patient not taking: Reported on 2/11/2022) 180 Tab 3    nitroglycerin (NITROLINGUAL) 400 mcg/spray spray 1 Spray by SubLINGual route every five (5) minutes as needed. (Patient not taking: Reported on 2/11/2022) 1 Bottle 2       Not on File        PE:     Physical Exam  Vitals and nursing note reviewed. Constitutional:       General: He is not in acute distress. Appearance: Normal appearance. He is not ill-appearing. Cardiovascular:      Pulses: Normal pulses. Pulmonary:      Effort: Pulmonary effort is normal. No respiratory distress. Musculoskeletal:         General: Swelling and tenderness present. No deformity or signs of injury. Normal range of motion. Cervical back: Normal range of motion and neck supple. Right lower leg: No edema. Left lower leg: No edema. Skin:     General: Skin is warm and dry. Capillary Refill: Capillary refill takes less than 2 seconds. Findings: No bruising or erythema. Neurological:      General: No focal deficit present. Mental Status: He is alert and oriented to person, place, and time. Psychiatric:         Mood and Affect: Mood normal.         Behavior: Behavior normal.            Hand:    Examination L Digit(s) R Digit(s)   1st CMC Tenderness -  -    1st CMC Grind -  -    Poly Nodes -  -    Heberden Nodes -  -    A1 Pulley Tenderness -  + RF   Triggering -  +    UCL Instability -  -    RCL Instability -  -    Lateral Stress Pain -  -    Palmar Cords -  -    Tabletop test -  -    Garrod's Pads -  -     Strength       Pinch Strength         ROM: Full        Imaging:     None indicated        ICD-10-CM ICD-9-CM    1. Acquired trigger finger of right ring finger  M65.341 727.03 INJECT TENDON SHEATH/LIGAMENT      triamcinolone acetonide (KENALOG) 10 mg/mL injection 5 mg         Plan:     Right ring finger A1 pulley injection    Follow-up and Dispositions    · Return if symptoms worsen or fail to improve. Plan was reviewed with patient, who verbalized agreement and understanding of the plan    [facility] NOTE        Chart reviewed for the following:   Palmer CANTU DO, have reviewed the History, Physical and updated the Allergic reactions for [address] performed immediately prior to start of procedure:   Liyah CANTU DO, have performed the following reviews on Zac Haynes prior to the start of the procedure:            * Patient was identified by name and date of birth   * Agreement on procedure being performed was verified  * Risks and Benefits explained to the patient  * Procedure site verified and marked as necessary  * Patient was positioned for comfort  * Consent was signed and verified     Time: 11:18 AM      Date of procedure: 2/15/2022    Procedure performed by: Liyah Vance DO    Provider assisted by: Gustavo Wiley MA    Patient assisted by: self    How tolerated by patient: tolerated the procedure well with no complications    Post Procedural Pain Scale: 0 - No Hurt    Comments: none    Procedure:  After consent was obtained, using sterile technique the right ring finger was prepped. Local anesthetic used: 1% lidocaine. Kenalog 5 mg and was then injected and the needle withdrawn. The procedure was well tolerated. The patient is asked to continue to rest the area for a few more days before resuming regular activities. It may be more painful for the first 1-2 days. Watch for fever, or increased swelling or persistent pain in the joint. Call or return to clinic prn if such symptoms occur or there is failure to improve as anticipated.

## 2022-03-18 PROBLEM — E78.00 HYPERCHOLESTEREMIA: Status: ACTIVE | Noted: 2018-04-23

## 2022-03-19 PROBLEM — I21.3 STEMI (ST ELEVATION MYOCARDIAL INFARCTION) (HCC): Status: ACTIVE | Noted: 2017-12-08

## 2022-03-19 PROBLEM — I20.0 UNSTABLE ANGINA (HCC): Status: ACTIVE | Noted: 2017-07-13

## 2022-03-20 PROBLEM — I25.10 CAD (CORONARY ARTERY DISEASE): Status: ACTIVE | Noted: 2017-07-13

## 2022-03-20 PROBLEM — R55 VASO VAGAL EPISODE: Status: ACTIVE | Noted: 2018-04-23

## 2023-03-29 ENCOUNTER — HOSPITAL ENCOUNTER (OUTPATIENT)
Facility: HOSPITAL | Age: 73
Discharge: HOME | End: 2023-04-01
Payer: MEDICARE

## 2023-03-29 DIAGNOSIS — M54.16 LUMBAR RADICULOPATHY: ICD-10-CM

## 2023-03-29 LAB
ALBUMIN SERPL-MCNC: 4 G/DL (ref 3.4–5)
ALBUMIN/GLOB SERPL: 1.4 (ref 0.8–1.7)
ALP SERPL-CCNC: 69 U/L (ref 45–117)
ALT SERPL-CCNC: 79 U/L (ref 16–61)
ANION GAP SERPL CALC-SCNC: 1 MMOL/L (ref 3–18)
AST SERPL-CCNC: 31 U/L (ref 10–38)
BILIRUB SERPL-MCNC: 0.6 MG/DL (ref 0.2–1)
BUN SERPL-MCNC: 13 MG/DL (ref 7–18)
BUN/CREAT SERPL: 16 (ref 12–20)
CALCIUM SERPL-MCNC: 8.8 MG/DL (ref 8.5–10.1)
CHLORIDE SERPL-SCNC: 112 MMOL/L (ref 100–111)
CHOLEST SERPL-MCNC: 133 MG/DL
CO2 SERPL-SCNC: 26 MMOL/L (ref 21–32)
CREAT SERPL-MCNC: 0.83 MG/DL (ref 0.6–1.3)
GLOBULIN SER CALC-MCNC: 2.9 G/DL (ref 2–4)
GLUCOSE SERPL-MCNC: 127 MG/DL (ref 74–99)
HDLC SERPL-MCNC: 42 MG/DL (ref 40–60)
HDLC SERPL: 3.2 (ref 0–5)
LDLC SERPL CALC-MCNC: 70.8 MG/DL (ref 0–100)
LIPID PANEL: NORMAL
POTASSIUM SERPL-SCNC: 4.1 MMOL/L (ref 3.5–5.5)
PROT SERPL-MCNC: 6.9 G/DL (ref 6.4–8.2)
SODIUM SERPL-SCNC: 139 MMOL/L (ref 136–145)
TRIGL SERPL-MCNC: 101 MG/DL
VLDLC SERPL CALC-MCNC: 20.2 MG/DL

## 2023-03-29 PROCEDURE — 80061 LIPID PANEL: CPT

## 2023-03-29 PROCEDURE — 36415 COLL VENOUS BLD VENIPUNCTURE: CPT

## 2023-03-29 PROCEDURE — 72100 X-RAY EXAM L-S SPINE 2/3 VWS: CPT

## 2023-03-29 PROCEDURE — 80053 COMPREHEN METABOLIC PANEL: CPT

## 2023-10-30 ENCOUNTER — HOSPITAL ENCOUNTER (OUTPATIENT)
Facility: HOSPITAL | Age: 73
Discharge: HOME | End: 2023-11-02
Payer: MEDICARE

## 2023-10-30 DIAGNOSIS — M48.061 SPINAL STENOSIS OF LUMBAR REGION, UNSPECIFIED WHETHER NEUROGENIC CLAUDICATION PRESENT: ICD-10-CM

## 2023-10-30 DIAGNOSIS — M54.16 LUMBAR RADICULOPATHY: ICD-10-CM

## 2023-10-30 PROCEDURE — 72110 X-RAY EXAM L-2 SPINE 4/>VWS: CPT

## 2023-12-18 NOTE — PROGRESS NOTES
Instructions for your surgery at [facility]      Today's Date:  12/18/2023      Patient's Name:  Bartolo Torres           Surgery Date:  01/15/2023              Please enter the main entrance of the hospital and check-in at the  located in the lobby. Once checked in at the , you will take the elevators to the second floor, and report to the waiting room on the left. The room will say Procedure Registration.    Do NOT eat or drink anything, including candy, gum, or ice chips after midnight prior to your surgery, unless you have specific instructions from your surgeon or anesthesia provider to do so.  Brush your teeth before coming to the hospital. You may swish with water, but do not swallow.  No smoking/Vaping/E-Cigarettes 24 hours prior to the day of surgery.  No alcohol 24 hours prior to the day of surgery.  No recreational drugs for one week prior to the day of surgery.  Bring Photo ID, Insurance information, and Co-pay if required on day of surgery.  Bring in pertinent legal documents, such as, Medical Power of , DNR, Advance Directive, etc.  Leave all valuables, including money/purse, at home.  Remove all jewelry, including ALL body piercings, nail polish, acrylic nails, and makeup (including mascara); no lotions, powders, deodorant, or perfume/cologne/after shave on the skin.  Follow instruction for Hibiclens washes and CHG wipes from surgeon's office.   Glasses and dentures may be worn to the hospital. They must be removed prior to surgery. Please bring case/container for glasses or dentures.   Contact lenses should not be worn on day of surgery.   Call your doctor's office if symptoms of a cold or illness develop within 24-48 hours prior to your surgery.  Call your doctor's office if you have any questions concerning insurance or co-pays.  15. AN ADULT (relative or friend 18 years or older) MUST DRIVE YOU HOME AFTER YOUR SURGERY.  16. Please make

## 2024-01-08 ENCOUNTER — HOSPITAL ENCOUNTER (OUTPATIENT)
Facility: HOSPITAL | Age: 74
Discharge: HOME | End: 2024-01-11
Payer: MEDICARE

## 2024-01-08 DIAGNOSIS — Z01.812 PRE-OPERATIVE LABORATORY EXAMINATION: ICD-10-CM

## 2024-01-08 LAB
ALBUMIN SERPL-MCNC: 3.8 G/DL (ref 3.4–5)
ALBUMIN/GLOB SERPL: 1.3 (ref 0.8–1.7)
ALP SERPL-CCNC: 77 U/L (ref 45–117)
ALT SERPL-CCNC: 55 U/L (ref 16–61)
ANION GAP SERPL CALC-SCNC: 6 MMOL/L (ref 3–18)
AST SERPL-CCNC: 23 U/L (ref 10–38)
BASOPHILS # BLD: 0.1 K/UL (ref 0–0.1)
BASOPHILS NFR BLD: 1 % (ref 0–2)
BILIRUB SERPL-MCNC: 0.6 MG/DL (ref 0.2–1)
BUN SERPL-MCNC: 19 MG/DL (ref 7–18)
BUN/CREAT SERPL: 16 (ref 12–20)
CALCIUM SERPL-MCNC: 8.4 MG/DL (ref 8.5–10.1)
CHLORIDE SERPL-SCNC: 108 MMOL/L (ref 100–111)
CO2 SERPL-SCNC: 25 MMOL/L (ref 21–32)
CREAT SERPL-MCNC: 1.16 MG/DL (ref 0.6–1.3)
DIFFERENTIAL METHOD BLD: ABNORMAL
EKG ATRIAL RATE: 83 BPM
EKG DIAGNOSIS: NORMAL
EKG P AXIS: 46 DEGREES
EKG P-R INTERVAL: 164 MS
EKG Q-T INTERVAL: 356 MS
EKG QRS DURATION: 84 MS
EKG QTC CALCULATION (BAZETT): 418 MS
EKG R AXIS: 30 DEGREES
EKG T AXIS: 37 DEGREES
EKG VENTRICULAR RATE: 83 BPM
EOSINOPHIL # BLD: 0.2 K/UL (ref 0–0.4)
EOSINOPHIL NFR BLD: 3 % (ref 0–5)
ERYTHROCYTE [DISTWIDTH] IN BLOOD BY AUTOMATED COUNT: 11.7 % (ref 11.6–14.5)
GLOBULIN SER CALC-MCNC: 2.9 G/DL (ref 2–4)
GLUCOSE SERPL-MCNC: 248 MG/DL (ref 74–99)
HCT VFR BLD AUTO: 41.6 % (ref 36–48)
HGB BLD-MCNC: 14.6 G/DL (ref 13–16)
IMM GRANULOCYTES # BLD AUTO: 0 K/UL (ref 0–0.04)
IMM GRANULOCYTES NFR BLD AUTO: 0 % (ref 0–0.5)
LYMPHOCYTES # BLD: 2.1 K/UL (ref 0.9–3.6)
LYMPHOCYTES NFR BLD: 31 % (ref 21–52)
MCH RBC QN AUTO: 33.8 PG (ref 24–34)
MCHC RBC AUTO-ENTMCNC: 35.1 G/DL (ref 31–37)
MCV RBC AUTO: 96.3 FL (ref 78–100)
MONOCYTES # BLD: 0.5 K/UL (ref 0.05–1.2)
MONOCYTES NFR BLD: 7 % (ref 3–10)
NEUTS SEG # BLD: 4 K/UL (ref 1.8–8)
NEUTS SEG NFR BLD: 58 % (ref 40–73)
NRBC # BLD: 0 K/UL (ref 0–0.01)
NRBC BLD-RTO: 0 PER 100 WBC
PLATELET # BLD AUTO: 171 K/UL (ref 135–420)
PMV BLD AUTO: 11.7 FL (ref 9.2–11.8)
POTASSIUM SERPL-SCNC: 3.9 MMOL/L (ref 3.5–5.5)
PROT SERPL-MCNC: 6.7 G/DL (ref 6.4–8.2)
RBC # BLD AUTO: 4.32 M/UL (ref 4.35–5.65)
SODIUM SERPL-SCNC: 139 MMOL/L (ref 136–145)
WBC # BLD AUTO: 6.8 K/UL (ref 4.6–13.2)

## 2024-01-08 PROCEDURE — 80053 COMPREHEN METABOLIC PANEL: CPT

## 2024-01-08 PROCEDURE — 85025 COMPLETE CBC W/AUTO DIFF WBC: CPT

## 2024-01-08 PROCEDURE — 93005 ELECTROCARDIOGRAM TRACING: CPT

## 2024-01-08 PROCEDURE — 36415 COLL VENOUS BLD VENIPUNCTURE: CPT

## 2024-01-08 PROCEDURE — 93010 ELECTROCARDIOGRAM REPORT: CPT | Performed by: INTERNAL MEDICINE

## 2024-01-10 ENCOUNTER — OFFICE VISIT (OUTPATIENT)
Age: 74
End: 2024-01-10
Payer: MEDICARE

## 2024-01-10 VITALS
HEIGHT: 70 IN | SYSTOLIC BLOOD PRESSURE: 139 MMHG | OXYGEN SATURATION: 97 % | DIASTOLIC BLOOD PRESSURE: 88 MMHG | BODY MASS INDEX: 26.48 KG/M2 | WEIGHT: 185 LBS | HEART RATE: 78 BPM

## 2024-01-10 DIAGNOSIS — E78.00 HYPERCHOLESTEREMIA: ICD-10-CM

## 2024-01-10 DIAGNOSIS — Z01.810 PREOP CARDIOVASCULAR EXAM: Primary | ICD-10-CM

## 2024-01-10 DIAGNOSIS — I25.10 CORONARY ARTERY DISEASE INVOLVING NATIVE CORONARY ARTERY OF NATIVE HEART WITHOUT ANGINA PECTORIS: ICD-10-CM

## 2024-01-10 PROCEDURE — G8427 DOCREV CUR MEDS BY ELIG CLIN: HCPCS | Performed by: INTERNAL MEDICINE

## 2024-01-10 PROCEDURE — 99204 OFFICE O/P NEW MOD 45 MIN: CPT | Performed by: INTERNAL MEDICINE

## 2024-01-10 PROCEDURE — 3017F COLORECTAL CA SCREEN DOC REV: CPT | Performed by: INTERNAL MEDICINE

## 2024-01-10 PROCEDURE — G8484 FLU IMMUNIZE NO ADMIN: HCPCS | Performed by: INTERNAL MEDICINE

## 2024-01-10 PROCEDURE — 1036F TOBACCO NON-USER: CPT | Performed by: INTERNAL MEDICINE

## 2024-01-10 PROCEDURE — 1123F ACP DISCUSS/DSCN MKR DOCD: CPT | Performed by: INTERNAL MEDICINE

## 2024-01-10 PROCEDURE — G8419 CALC BMI OUT NRM PARAM NOF/U: HCPCS | Performed by: INTERNAL MEDICINE

## 2024-01-10 ASSESSMENT — PATIENT HEALTH QUESTIONNAIRE - PHQ9
SUM OF ALL RESPONSES TO PHQ QUESTIONS 1-9: 0
1. LITTLE INTEREST OR PLEASURE IN DOING THINGS: 0
SUM OF ALL RESPONSES TO PHQ QUESTIONS 1-9: 0
2. FEELING DOWN, DEPRESSED OR HOPELESS: 0
SUM OF ALL RESPONSES TO PHQ9 QUESTIONS 1 & 2: 0

## 2024-01-10 NOTE — PROGRESS NOTES
Bartolo Torres presents today for   Chief Complaint   Patient presents with    Surgical Clearance     R lumbar three/four Laminectomy        Bartolo Torres preferred language for health care discussion is english/other.    Is someone accompanying this pt? no    Is the patient using any DME equipment during OV? no    Depression Screening:  Depression: Not at risk (1/10/2024)    PHQ-2     PHQ-2 Score: 0        Learning Assessment:  Who is the primary learner? Patient    What is the preferred language for health care of the primary learner? ENGLISH    How does the primary learner prefer to learn new concepts? DEMONSTRATION    Answered By patient    Relationship to Learner SELF           Pt currently taking Anticoagulant therapy? no    Pt currently taking Antiplatelet therapy ? no      Coordination of Care:  1. Have you been to the ER, urgent care clinic since your last visit? Hospitalized since your last visit? no    2. Have you seen or consulted any other health care providers outside of the [facility] since your last visit? Include any pap smears or colon screening. no

## 2024-01-10 NOTE — PROGRESS NOTES
Bartolo Torres    Chief Complaint   Patient presents with    Surgical Clearance     R lumbar three/four Laminectomy        HPI    Bartolo Torres is a 73 y.o. with CAD, HL here for preop risk prior to laminectomy next week. He was a patient of Dr. Bermudez. He had symptoms back in 2017, and eventually s/p PCI RCA. He did well, had normal EF so followed up as needed. He was instructed to just continue statin and ASA 81 mg after the year of DAPT.     He has no CV complaints. Was boxing. Though his back issues he can walk several blocks but it would be painful.    Past Medical History:   Diagnosis Date    CAD (coronary artery disease) 2017    stent x2    Mixed hyperlipidemia     Treadmill stress test negative for angina pectoris 05/17/2017    At least intermediate risk.  Positive EKG on maximal EST.  Chest pain resolved within 1 min of recovery.  ST changes persisted 5 mins of recovery.  Ex time 4 min 38 sec.         Past Surgical History:   Procedure Laterality Date    CARDIAC CATH PROCEDURE  7/13/2017         COLONOSCOPY      CORONARY ARTERY ANGIOGRAM  7/13/2017         CORONARY STENT EA ADDL VESSEL  7/13/2017         CORONARY STENT SINGLE W/PTCA  7/13/2017         LV ANGIOGRAPHY  7/13/2017         MODERATE SEDATION  7/13/2017         TONSILLECTOMY         Current Outpatient Medications   Medication Sig Dispense Refill    rosuvastatin (CRESTOR) 10 MG tablet take 1 tablet by mouth once daily      aspirin 81 MG EC tablet Take 81 mg by mouth daily (Patient not taking: Reported on 1/10/2024)       No current facility-administered medications for this visit.       No Known Allergies    Social History     Socioeconomic History    Marital status:      Spouse name: Not on file    Number of children: Not on file    Years of education: Not on file    Highest education level: Not on file   Occupational History    Not on file   Tobacco Use    Smoking status: Former     Current packs/day: 0.00     Types: Cigarettes

## 2024-01-12 ENCOUNTER — ANESTHESIA EVENT (OUTPATIENT)
Facility: HOSPITAL | Age: 74
End: 2024-01-12
Payer: MEDICARE

## 2024-01-12 NOTE — H&P
Pre-Admission History and Physical    Patient: Bartolo Torres   MRN: [id number]   SSN: [id number]   YOB: 1950   Age: 73 y.o.   Sex: male     Patient scheduled for: right lumbar three/four laminectomy.  Date of surgery: 1/15/2024.  Surgeon: Ovi Dominguez MD    HPI:  Bartolo Torres is a 73 y.o. male with continues with significant back pain and leg pain on the right.   He reports a pain level of 6/10.  MRI demonstrates L3/4 spinal stenosis.   This patient has failed the presurgical conservative treatments  including physical therapy, spinal block injections and medications. Pain has impacted the patient's functional ability. He is being admitted for surgical intervention.         Past Medical History:   Diagnosis Date    CAD (coronary artery disease) 2017    stent x2    Mixed hyperlipidemia     Treadmill stress test negative for angina pectoris 2017    At least intermediate risk.  Positive EKG on maximal EST.  Chest pain resolved within 1 min of recovery.  ST changes persisted 5 mins of recovery.  Ex time 4 min 38 sec.       Social History     Socioeconomic History    Marital status:      Spouse name: None    Number of children: None    Years of education: None    Highest education level: None   Tobacco Use    Smoking status: Former     Current packs/day: 0.00     Types: Cigarettes     Quit date: 2017     Years since quittin.0    Smokeless tobacco: Never   Vaping Use    Vaping Use: Never used   Substance and Sexual Activity    Alcohol use: Yes     Comment: occ    Drug use: Never    Sexual activity: Defer     Past Surgical History:   Procedure Laterality Date    CARDIAC CATH PROCEDURE  2017         COLONOSCOPY      CORONARY ARTERY ANGIOGRAM  2017         CORONARY STENT EA ADDL VESSEL  2017         CORONARY STENT SINGLE W/PTCA  2017         LV ANGIOGRAPHY  2017         MODERATE SEDATION  2017         TONSILLECTOMY       Family History

## 2024-01-15 ENCOUNTER — APPOINTMENT (OUTPATIENT)
Facility: HOSPITAL | Age: 74
End: 2024-01-15
Attending: ORTHOPAEDIC SURGERY
Payer: MEDICARE

## 2024-01-15 ENCOUNTER — ANESTHESIA (OUTPATIENT)
Facility: HOSPITAL | Age: 74
End: 2024-01-15
Payer: MEDICARE

## 2024-01-15 ENCOUNTER — HOSPITAL ENCOUNTER (OUTPATIENT)
Facility: HOSPITAL | Age: 74
Setting detail: OUTPATIENT SURGERY
Discharge: HOME | End: 2024-01-15
Attending: ORTHOPAEDIC SURGERY | Admitting: ORTHOPAEDIC SURGERY
Payer: MEDICARE

## 2024-01-15 VITALS
RESPIRATION RATE: 18 BRPM | OXYGEN SATURATION: 96 % | SYSTOLIC BLOOD PRESSURE: 120 MMHG | HEIGHT: 70 IN | TEMPERATURE: 96.9 F | WEIGHT: 183 LBS | DIASTOLIC BLOOD PRESSURE: 74 MMHG | BODY MASS INDEX: 26.2 KG/M2 | HEART RATE: 60 BPM

## 2024-01-15 DIAGNOSIS — Z98.890 S/P LAMINECTOMY: Primary | ICD-10-CM

## 2024-01-15 PROCEDURE — 2500000003 HC RX 250 WO HCPCS: Performed by: NURSE ANESTHETIST, CERTIFIED REGISTERED

## 2024-01-15 PROCEDURE — 3700000001 HC ADD 15 MINUTES (ANESTHESIA): Performed by: ORTHOPAEDIC SURGERY

## 2024-01-15 PROCEDURE — 7100000010 HC PHASE II RECOVERY - FIRST 15 MIN: Performed by: ORTHOPAEDIC SURGERY

## 2024-01-15 PROCEDURE — 2720000010 HC SURG SUPPLY STERILE: Performed by: ORTHOPAEDIC SURGERY

## 2024-01-15 PROCEDURE — 3600000002 HC SURGERY LEVEL 2 BASE: Performed by: ORTHOPAEDIC SURGERY

## 2024-01-15 PROCEDURE — 2580000003 HC RX 258: Performed by: PHYSICIAN ASSISTANT

## 2024-01-15 PROCEDURE — 6360000002 HC RX W HCPCS: Performed by: ORTHOPAEDIC SURGERY

## 2024-01-15 PROCEDURE — 7100000001 HC PACU RECOVERY - ADDTL 15 MIN: Performed by: ORTHOPAEDIC SURGERY

## 2024-01-15 PROCEDURE — 6360000002 HC RX W HCPCS: Performed by: NURSE ANESTHETIST, CERTIFIED REGISTERED

## 2024-01-15 PROCEDURE — 2580000003 HC RX 258: Performed by: NURSE ANESTHETIST, CERTIFIED REGISTERED

## 2024-01-15 PROCEDURE — 3600000012 HC SURGERY LEVEL 2 ADDTL 15MIN: Performed by: ORTHOPAEDIC SURGERY

## 2024-01-15 PROCEDURE — 6360000002 HC RX W HCPCS: Performed by: PHYSICIAN ASSISTANT

## 2024-01-15 PROCEDURE — 6370000000 HC RX 637 (ALT 250 FOR IP): Performed by: ORTHOPAEDIC SURGERY

## 2024-01-15 PROCEDURE — 2580000003 HC RX 258: Performed by: ORTHOPAEDIC SURGERY

## 2024-01-15 PROCEDURE — 7100000011 HC PHASE II RECOVERY - ADDTL 15 MIN: Performed by: ORTHOPAEDIC SURGERY

## 2024-01-15 PROCEDURE — 7100000000 HC PACU RECOVERY - FIRST 15 MIN: Performed by: ORTHOPAEDIC SURGERY

## 2024-01-15 PROCEDURE — 2709999900 HC NON-CHARGEABLE SUPPLY: Performed by: ORTHOPAEDIC SURGERY

## 2024-01-15 PROCEDURE — 6370000000 HC RX 637 (ALT 250 FOR IP): Performed by: NURSE ANESTHETIST, CERTIFIED REGISTERED

## 2024-01-15 PROCEDURE — 3700000000 HC ANESTHESIA ATTENDED CARE: Performed by: ORTHOPAEDIC SURGERY

## 2024-01-15 PROCEDURE — 2500000003 HC RX 250 WO HCPCS: Performed by: ORTHOPAEDIC SURGERY

## 2024-01-15 RX ORDER — SODIUM CHLORIDE, SODIUM LACTATE, POTASSIUM CHLORIDE, CALCIUM CHLORIDE 600; 310; 30; 20 MG/100ML; MG/100ML; MG/100ML; MG/100ML
INJECTION, SOLUTION INTRAVENOUS CONTINUOUS
Status: DISCONTINUED | OUTPATIENT
Start: 2024-01-15 | End: 2024-01-15 | Stop reason: HOSPADM

## 2024-01-15 RX ORDER — LIDOCAINE HYDROCHLORIDE 20 MG/ML
INJECTION, SOLUTION EPIDURAL; INFILTRATION; INTRACAUDAL; PERINEURAL PRN
Status: DISCONTINUED | OUTPATIENT
Start: 2024-01-15 | End: 2024-01-15 | Stop reason: SDUPTHER

## 2024-01-15 RX ORDER — ACETAMINOPHEN 500 MG
1000 TABLET ORAL ONCE
Status: COMPLETED | OUTPATIENT
Start: 2024-01-15 | End: 2024-01-15

## 2024-01-15 RX ORDER — FAMOTIDINE 20 MG/1
20 TABLET, FILM COATED ORAL ONCE
Status: COMPLETED | OUTPATIENT
Start: 2024-01-15 | End: 2024-01-15

## 2024-01-15 RX ORDER — BUPIVACAINE HYDROCHLORIDE 5 MG/ML
INJECTION, SOLUTION EPIDURAL; INTRACAUDAL PRN
Status: DISCONTINUED | OUTPATIENT
Start: 2024-01-15 | End: 2024-01-15 | Stop reason: ALTCHOICE

## 2024-01-15 RX ORDER — FOLIC ACID 1 MG/1
0.75 TABLET ORAL DAILY
COMMUNITY

## 2024-01-15 RX ORDER — ONDANSETRON 2 MG/ML
4 INJECTION INTRAMUSCULAR; INTRAVENOUS
Status: DISCONTINUED | OUTPATIENT
Start: 2024-01-15 | End: 2024-01-15 | Stop reason: HOSPADM

## 2024-01-15 RX ORDER — SUCCINYLCHOLINE/SOD CL,ISO/PF 100 MG/5ML
SYRINGE (ML) INTRAVENOUS PRN
Status: DISCONTINUED | OUTPATIENT
Start: 2024-01-15 | End: 2024-01-15 | Stop reason: SDUPTHER

## 2024-01-15 RX ORDER — PREGABALIN 75 MG/1
75 CAPSULE ORAL ONCE
Status: COMPLETED | OUTPATIENT
Start: 2024-01-15 | End: 2024-01-15

## 2024-01-15 RX ORDER — OXYCODONE HYDROCHLORIDE 5 MG/1
5 TABLET ORAL EVERY 6 HOURS PRN
Qty: 28 TABLET | Refills: 0 | Status: SHIPPED | OUTPATIENT
Start: 2024-01-15 | End: 2024-01-22

## 2024-01-15 RX ORDER — ACETAMINOPHEN 500 MG
1000 TABLET ORAL ONCE
Status: DISCONTINUED | OUTPATIENT
Start: 2024-01-15 | End: 2024-01-15 | Stop reason: SDUPTHER

## 2024-01-15 RX ORDER — MIDAZOLAM HYDROCHLORIDE 1 MG/ML
INJECTION INTRAMUSCULAR; INTRAVENOUS PRN
Status: DISCONTINUED | OUTPATIENT
Start: 2024-01-15 | End: 2024-01-15 | Stop reason: SDUPTHER

## 2024-01-15 RX ORDER — DEXAMETHASONE SODIUM PHOSPHATE 4 MG/ML
INJECTION, SOLUTION INTRA-ARTICULAR; INTRALESIONAL; INTRAMUSCULAR; INTRAVENOUS; SOFT TISSUE PRN
Status: DISCONTINUED | OUTPATIENT
Start: 2024-01-15 | End: 2024-01-15 | Stop reason: SDUPTHER

## 2024-01-15 RX ORDER — GABAPENTIN 300 MG/1
300 CAPSULE ORAL 3 TIMES DAILY
COMMUNITY
Start: 2024-01-08

## 2024-01-15 RX ORDER — GLYCOPYRROLATE 0.2 MG/ML
INJECTION INTRAMUSCULAR; INTRAVENOUS PRN
Status: DISCONTINUED | OUTPATIENT
Start: 2024-01-15 | End: 2024-01-15 | Stop reason: SDUPTHER

## 2024-01-15 RX ORDER — ONDANSETRON 2 MG/ML
INJECTION INTRAMUSCULAR; INTRAVENOUS PRN
Status: DISCONTINUED | OUTPATIENT
Start: 2024-01-15 | End: 2024-01-15 | Stop reason: SDUPTHER

## 2024-01-15 RX ORDER — PREGABALIN 50 MG/1
50 CAPSULE ORAL 2 TIMES DAILY
Status: DISCONTINUED | OUTPATIENT
Start: 2024-01-15 | End: 2024-01-15 | Stop reason: HOSPADM

## 2024-01-15 RX ORDER — ROCURONIUM BROMIDE 10 MG/ML
INJECTION, SOLUTION INTRAVENOUS PRN
Status: DISCONTINUED | OUTPATIENT
Start: 2024-01-15 | End: 2024-01-15 | Stop reason: SDUPTHER

## 2024-01-15 RX ORDER — EPHEDRINE SULFATE/0.9% NACL/PF 50 MG/5 ML
SYRINGE (ML) INTRAVENOUS PRN
Status: DISCONTINUED | OUTPATIENT
Start: 2024-01-15 | End: 2024-01-15 | Stop reason: SDUPTHER

## 2024-01-15 RX ORDER — CELECOXIB 100 MG/1
200 CAPSULE ORAL ONCE
Status: COMPLETED | OUTPATIENT
Start: 2024-01-15 | End: 2024-01-15

## 2024-01-15 RX ORDER — PHENYLEPHRINE HCL IN 0.9% NACL 1 MG/10 ML
SYRINGE (ML) INTRAVENOUS PRN
Status: DISCONTINUED | OUTPATIENT
Start: 2024-01-15 | End: 2024-01-15 | Stop reason: SDUPTHER

## 2024-01-15 RX ORDER — TAMSULOSIN HYDROCHLORIDE 0.4 MG/1
0.4 CAPSULE ORAL ONCE
Status: COMPLETED | OUTPATIENT
Start: 2024-01-15 | End: 2024-01-15

## 2024-01-15 RX ORDER — KETOROLAC TROMETHAMINE 15 MG/ML
INJECTION, SOLUTION INTRAMUSCULAR; INTRAVENOUS PRN
Status: DISCONTINUED | OUTPATIENT
Start: 2024-01-15 | End: 2024-01-15 | Stop reason: SDUPTHER

## 2024-01-15 RX ORDER — VANCOMYCIN HYDROCHLORIDE 1 G/20ML
INJECTION, POWDER, LYOPHILIZED, FOR SOLUTION INTRAVENOUS PRN
Status: DISCONTINUED | OUTPATIENT
Start: 2024-01-15 | End: 2024-01-15 | Stop reason: ALTCHOICE

## 2024-01-15 RX ORDER — NEOSTIGMINE METHYLSULFATE 1 MG/ML
INJECTION, SOLUTION INTRAVENOUS PRN
Status: DISCONTINUED | OUTPATIENT
Start: 2024-01-15 | End: 2024-01-15 | Stop reason: SDUPTHER

## 2024-01-15 RX ORDER — FENTANYL CITRATE 50 UG/ML
INJECTION, SOLUTION INTRAMUSCULAR; INTRAVENOUS PRN
Status: DISCONTINUED | OUTPATIENT
Start: 2024-01-15 | End: 2024-01-15 | Stop reason: SDUPTHER

## 2024-01-15 RX ORDER — FENTANYL CITRATE 50 UG/ML
50 INJECTION, SOLUTION INTRAMUSCULAR; INTRAVENOUS EVERY 5 MIN PRN
Status: DISCONTINUED | OUTPATIENT
Start: 2024-01-15 | End: 2024-01-15 | Stop reason: HOSPADM

## 2024-01-15 RX ORDER — PROPOFOL 10 MG/ML
INJECTION, EMULSION INTRAVENOUS PRN
Status: DISCONTINUED | OUTPATIENT
Start: 2024-01-15 | End: 2024-01-15 | Stop reason: SDUPTHER

## 2024-01-15 RX ORDER — LIDOCAINE HYDROCHLORIDE 10 MG/ML
1 INJECTION, SOLUTION EPIDURAL; INFILTRATION; INTRACAUDAL; PERINEURAL
Status: DISCONTINUED | OUTPATIENT
Start: 2024-01-15 | End: 2024-01-15 | Stop reason: HOSPADM

## 2024-01-15 RX ADMIN — CELECOXIB 200 MG: 100 CAPSULE ORAL at 09:30

## 2024-01-15 RX ADMIN — Medication 5 MG: at 12:08

## 2024-01-15 RX ADMIN — MIDAZOLAM 2 MG: 1 INJECTION, SOLUTION INTRAMUSCULAR; INTRAVENOUS at 11:23

## 2024-01-15 RX ADMIN — Medication 3 MG: at 12:29

## 2024-01-15 RX ADMIN — ONDANSETRON 4 MG: 2 INJECTION INTRAMUSCULAR; INTRAVENOUS at 12:23

## 2024-01-15 RX ADMIN — Medication 100 MCG: at 12:20

## 2024-01-15 RX ADMIN — Medication 50 MCG: at 12:08

## 2024-01-15 RX ADMIN — FENTANYL CITRATE 100 MCG: 50 INJECTION INTRAMUSCULAR; INTRAVENOUS at 11:29

## 2024-01-15 RX ADMIN — TRANEXAMIC ACID 1000 MG: 100 INJECTION, SOLUTION INTRAVENOUS at 11:48

## 2024-01-15 RX ADMIN — ACETAMINOPHEN 1000 MG: 500 TABLET ORAL at 09:30

## 2024-01-15 RX ADMIN — Medication 5 MG: at 12:35

## 2024-01-15 RX ADMIN — PROPOFOL 20 MG: 10 INJECTION, EMULSION INTRAVENOUS at 12:33

## 2024-01-15 RX ADMIN — Medication 100 MCG: at 12:36

## 2024-01-15 RX ADMIN — PREGABALIN 75 MG: 75 CAPSULE ORAL at 09:30

## 2024-01-15 RX ADMIN — Medication 100 MCG: at 12:14

## 2024-01-15 RX ADMIN — SODIUM CHLORIDE, SODIUM LACTATE, POTASSIUM CHLORIDE, AND CALCIUM CHLORIDE: 600; 310; 30; 20 INJECTION, SOLUTION INTRAVENOUS at 12:34

## 2024-01-15 RX ADMIN — GLYCOPYRROLATE 0.4 MG: 0.4 INJECTION INTRAMUSCULAR; INTRAVENOUS at 12:29

## 2024-01-15 RX ADMIN — TRANEXAMIC ACID 1000 MG: 100 INJECTION, SOLUTION INTRAVENOUS at 12:24

## 2024-01-15 RX ADMIN — Medication 100 MCG: at 11:45

## 2024-01-15 RX ADMIN — ROCURONIUM BROMIDE 5 MG: 10 INJECTION, SOLUTION INTRAVENOUS at 11:29

## 2024-01-15 RX ADMIN — WATER 2000 MG: 1 INJECTION, SOLUTION INTRAMUSCULAR; INTRAVENOUS; SUBCUTANEOUS at 11:44

## 2024-01-15 RX ADMIN — LIDOCAINE HYDROCHLORIDE 50 MG: 20 INJECTION, SOLUTION EPIDURAL; INFILTRATION; INTRACAUDAL; PERINEURAL at 11:29

## 2024-01-15 RX ADMIN — DEXAMETHASONE SODIUM PHOSPHATE 8 MG: 4 INJECTION INTRA-ARTICULAR; INTRALESIONAL; INTRAMUSCULAR; INTRAVENOUS; SOFT TISSUE at 11:50

## 2024-01-15 RX ADMIN — TAMSULOSIN HYDROCHLORIDE 0.4 MG: 0.4 CAPSULE ORAL at 09:30

## 2024-01-15 RX ADMIN — ROCURONIUM BROMIDE 25 MG: 10 INJECTION, SOLUTION INTRAVENOUS at 11:35

## 2024-01-15 RX ADMIN — Medication 50 MCG: at 12:10

## 2024-01-15 RX ADMIN — SODIUM CHLORIDE, SODIUM LACTATE, POTASSIUM CHLORIDE, AND CALCIUM CHLORIDE: 600; 310; 30; 20 INJECTION, SOLUTION INTRAVENOUS at 09:28

## 2024-01-15 RX ADMIN — Medication 5 MG: at 12:36

## 2024-01-15 RX ADMIN — KETOROLAC TROMETHAMINE 15 MG: 15 INJECTION, SOLUTION INTRAMUSCULAR; INTRAVENOUS at 12:24

## 2024-01-15 RX ADMIN — Medication 100 MG: at 11:30

## 2024-01-15 RX ADMIN — PROPOFOL 180 MG: 10 INJECTION, EMULSION INTRAVENOUS at 11:29

## 2024-01-15 RX ADMIN — FAMOTIDINE 20 MG: 20 TABLET ORAL at 09:30

## 2024-01-15 ASSESSMENT — PAIN SCALES - GENERAL
PAINLEVEL_OUTOF10: 0
PAINLEVEL_OUTOF10: 0

## 2024-01-15 ASSESSMENT — PAIN - FUNCTIONAL ASSESSMENT: PAIN_FUNCTIONAL_ASSESSMENT: 0-10

## 2024-01-15 NOTE — DISCHARGE INSTRUCTIONS
machinery  Do not make important personal or business decisions  Do  not drink alcoholic beverages  If you have not urinated within 8 hours after discharge, please contact your surgeon on call.

## 2024-01-15 NOTE — PERIOP NOTE
Dr. Dominguez updated on patient bladder scan and  PVR. At approx 1605. Order to straight cath patient then patient can go home. If patient refuses straight cath,  patient may still go home but return to Emergency room if unable to urinate. Patient refused straight cath. Patient stated he would return to Emergency room if unable to urinate as ordered.patient left the unit with spouse via wheel chair to the car. Dressing clean dry intact. See LDA.

## 2024-01-15 NOTE — ANESTHESIA PRE PROCEDURE
rhythm.  · Gated SPECT: Left ventricular function post-stress was normal. Calculated ejection fraction is 60%. There is no evidence of transient ischemic dilation (TID).The TID ratio is 0.95.  · Negative stress test.  · EKG stress test results correlate with a low risk of inducible myocardial ischemia supported by the following factors: absence of anginal symptoms during stress test. Clinical correlation is advised, but in the absence of progressive or typical angina, further cardiac evaluation for ischemic heart disease may not be necessary.  · Left ventricular perfusion is normal.  · Negative myocardial perfusion imaging. Myocardial perfusion imaging supports a low risk stress test.       Neuro/Psych:               GI/Hepatic/Renal:             Endo/Other:                     Abdominal:             Vascular:          Other Findings:       Anesthesia Plan      general     ASA 3       Induction: intravenous.    MIPS: Postoperative opioids intended.  Anesthetic plan and risks discussed with patient.                    MICHAEL RAMOS MD   1/15/2024

## 2024-01-15 NOTE — INTERVAL H&P NOTE
Update History & Physical    The patient's History and Physical of January 12, 2024 was reviewed with the patient and I examined the patient. There was no change. The surgical site was confirmed by the patient and me.     Plan: The risks, benefits, expected outcome, and alternative to the recommended procedure have been discussed with the patient. Patient understands and wants to proceed with the procedure.     Electronically signed by ROBBIE FONTENOT MD on 1/15/2024 at 10:40 AM

## 2024-01-15 NOTE — BRIEF OP NOTE
Brief Postoperative Note      Patient: Bartolo Torres  YOB: 1950  MRN: [id number]    Date of Procedure: 1/15/2024    Pre-Op Diagnosis Codes:     * Spinal stenosis, lumbar region with neurogenic claudication [M48.062]    Post-Op Diagnosis: Same       Procedure(s):  RIGHT LUMBAR THREE/FOUR LAMINECTOMY    Surgeon(s):  Robbie Dominguez MD    Assistant:  Surgical Assistant: Des Jolly  Physician Assistant: Tamia Sanz PA-C    Anesthesia: General    Estimated Blood Loss (mL): Minimal    Complications: None    Specimens:   * No specimens in log *    Implants:  * No implants in log *      Drains: * No LDAs found *    Findings: stenosis, large synovial cyst      Electronically signed by ROBBIE DOMINGUEZ MD on 1/15/2024 at 12:26 PM

## 2024-01-15 NOTE — PERIOP NOTE
Patient /Family /Designee has been informed that [facility] is not responsible for patient belongings per policy and the signed [facility] Patient Agreement document.  Personal items should be sent home or checked in with security.  Patient /Family /Designee selected the following action:                            [x]  Send personal items home with a family member or friend                                                 []  Check in personal items with security, excluding clothing                            []  Maintain personal items at the bedside, against recommendation                                 by Isidro Royal [facility]                                   ** If patient /family /designee chooses to maintain personal items at the bedside,                                      Complete the patient belongings inventory in the EMR.

## 2024-01-15 NOTE — ANESTHESIA POSTPROCEDURE EVALUATION
Department of Anesthesiology  Postprocedure Note    Patient: Bartolo Torres  MRN: [id number]  YOB: 1950  Date of evaluation: 1/15/2024    Procedure Summary       Date: 01/15/24 Room / Location: [facility] MAIN 06 / [facility] MAIN OR    Anesthesia Start: 1123 Anesthesia Stop: 1255    Procedure: RIGHT LUMBAR THREE/FOUR LAMINECTOMY (Right: Spine Lumbar) Diagnosis:       Spinal stenosis, lumbar region with neurogenic claudication      (Spinal stenosis, lumbar region with neurogenic claudication [M48.062])    Surgeons: Ovi Dominguez MD Responsible Provider: Jeff Curiel MD    Anesthesia Type: General ASA Status: 3            Anesthesia Type: General    Crystal Phase I: Crystal Score: 10    Crystal Phase II:      Anesthesia Post Evaluation    Patient location during evaluation: bedside  Patient participation: complete - patient participated  Airway patency: patent  Cardiovascular status: hemodynamically stable  Respiratory status: acceptable  Hydration status: stable    No notable events documented.

## 2024-01-16 NOTE — OP NOTE
with #1 Vicryl, subcutaneous tissues with 2-0 Vicryl, skin closed with a 3-0 Monocryl subcuticular suture and Dermabond.  A sterile occlusive dressing was placed upon the wound.  All counts were correct.      MD NANCY [id number]  D:  01/15/2024 12:40  T:  01/15/2024 19:19  JOB #:  [id number]

## 2024-01-17 ENCOUNTER — TELEPHONE (OUTPATIENT)
Facility: HOSPITAL | Age: 74
End: 2024-01-17

## 2025-03-05 ENCOUNTER — OFFICE VISIT (OUTPATIENT)
Age: 75
End: 2025-03-05
Payer: MEDICARE

## 2025-03-05 VITALS — HEIGHT: 70 IN | WEIGHT: 186 LBS | BODY MASS INDEX: 26.63 KG/M2

## 2025-03-05 DIAGNOSIS — M65.341 TRIGGER RING FINGER OF RIGHT HAND: Primary | ICD-10-CM

## 2025-03-05 PROCEDURE — 1159F MED LIST DOCD IN RCRD: CPT

## 2025-03-05 PROCEDURE — 99203 OFFICE O/P NEW LOW 30 MIN: CPT

## 2025-03-05 PROCEDURE — 3017F COLORECTAL CA SCREEN DOC REV: CPT

## 2025-03-05 PROCEDURE — 1036F TOBACCO NON-USER: CPT

## 2025-03-05 PROCEDURE — 1125F AMNT PAIN NOTED PAIN PRSNT: CPT

## 2025-03-05 PROCEDURE — 20550 NJX 1 TENDON SHEATH/LIGAMENT: CPT

## 2025-03-05 PROCEDURE — 1160F RVW MEDS BY RX/DR IN RCRD: CPT

## 2025-03-05 PROCEDURE — G8419 CALC BMI OUT NRM PARAM NOF/U: HCPCS

## 2025-03-05 PROCEDURE — 1123F ACP DISCUSS/DSCN MKR DOCD: CPT

## 2025-03-05 PROCEDURE — G8427 DOCREV CUR MEDS BY ELIG CLIN: HCPCS

## 2025-03-05 RX ORDER — LIDOCAINE HYDROCHLORIDE 10 MG/ML
0.5 INJECTION, SOLUTION INFILTRATION; PERINEURAL ONCE
Status: COMPLETED | OUTPATIENT
Start: 2025-03-05 | End: 2025-03-05

## 2025-03-05 RX ADMIN — LIDOCAINE HYDROCHLORIDE 0.5 ML: 10 INJECTION, SOLUTION INFILTRATION; PERINEURAL at 09:19

## 2025-03-05 NOTE — PROGRESS NOTES
Bartolo Torres is a 74 y.o. male right handed .  Worker's Compensation and legal considerations: none    Chief Complaint   Patient presents with    Finger Pain     Right ring     Pain Score:   8    Subjective:     Initial HPI: Patient presents today with a concern of right ring finger pain and locking over the last 6 or so weeks.    Date of onset: 6 weeks prior to initial evaluation  Injury: No  Prior Treatment:  Yes: Comment: History of trigger finger injection 2022  Contributory history: n/a    ROS: Review of Systems - General ROS: negative except HPI    Past Medical History:   Diagnosis Date    CAD (coronary artery disease) 2017    stent x2    Mixed hyperlipidemia     Treadmill stress test negative for angina pectoris 05/17/2017    At least intermediate risk.  Positive EKG on maximal EST.  Chest pain resolved within 1 min of recovery.  ST changes persisted 5 mins of recovery.  Ex time 4 min 38 sec.         Past Surgical History:   Procedure Laterality Date    CARDIAC CATH PROCEDURE  7/13/2017         COLONOSCOPY      CORONARY ARTERY ANGIOGRAM  7/13/2017         CORONARY STENT EA ADDL VESSEL  7/13/2017         CORONARY STENT SINGLE W/PTCA  7/13/2017         LUMBAR SPINE SURGERY Right 1/15/2024    RIGHT LUMBAR THREE/FOUR LAMINECTOMY performed by Ovi Dominguez MD at [facility] MAIN OR    LV ANGIOGRAPHY  7/13/2017         MODERATE SEDATION  7/13/2017         TONSILLECTOMY          Current Outpatient Medications   Medication Sig Dispense Refill    folic acid (FOLVITE) 1 MG tablet Take 0.75 mg by mouth daily      aspirin 81 MG EC tablet Take 1 tablet by mouth daily      rosuvastatin (CRESTOR) 10 MG tablet take 1 tablet by mouth once daily      gabapentin (NEURONTIN) 300 MG capsule Take 1 capsule by mouth 3 times daily.       Current Facility-Administered Medications   Medication Dose Route Frequency Provider Last Rate Last Admin    lidocaine 1 % injection 0.5 mL  0.5 mL Other Once         triamcinolone

## 2025-07-30 ENCOUNTER — OFFICE VISIT (OUTPATIENT)
Age: 75
End: 2025-07-30
Payer: MEDICARE

## 2025-07-30 VITALS — HEIGHT: 70 IN | WEIGHT: 186 LBS | BODY MASS INDEX: 26.63 KG/M2

## 2025-07-30 DIAGNOSIS — M65.341 TRIGGER RING FINGER OF RIGHT HAND: Primary | ICD-10-CM

## 2025-07-30 PROCEDURE — 20550 NJX 1 TENDON SHEATH/LIGAMENT: CPT

## 2025-07-30 RX ORDER — LIDOCAINE HYDROCHLORIDE 10 MG/ML
0.5 INJECTION, SOLUTION INFILTRATION; PERINEURAL ONCE
Status: COMPLETED | OUTPATIENT
Start: 2025-07-30 | End: 2025-07-30

## 2025-07-30 RX ADMIN — LIDOCAINE HYDROCHLORIDE 0.5 ML: 10 INJECTION, SOLUTION INFILTRATION; PERINEURAL at 11:29

## 2025-07-30 NOTE — PROGRESS NOTES
Bartolo Torres is a 75 y.o. male right handed .  Worker's Compensation and legal considerations: none    Chief Complaint   Patient presents with    Follow-up     Right ring finger     Pain Score:   0 - No pain    Subjective:     7/30/2025 HPI: Patient presents today due to recurrence of right ring finger locking.  Reports previous injection lasted up until a week or so ago.    Initial HPI 3/5/2025: Patient presents today with a concern of right ring finger pain and locking over the last 6 or so weeks.    Date of onset: 6 weeks prior to initial evaluation  Injury: No  Prior Treatment:  Yes: Comment: History of trigger finger injection 2022, 2025 injection  Contributory history: n/a    ROS: Review of Systems - General ROS: negative except HPI    Past Medical History:   Diagnosis Date    CAD (coronary artery disease) 2017    stent x2    Mixed hyperlipidemia     Treadmill stress test negative for angina pectoris 05/17/2017    At least intermediate risk.  Positive EKG on maximal EST.  Chest pain resolved within 1 min of recovery.  ST changes persisted 5 mins of recovery.  Ex time 4 min 38 sec.         Past Surgical History:   Procedure Laterality Date    CARDIAC CATH PROCEDURE  7/13/2017         COLONOSCOPY      CORONARY ARTERY ANGIOGRAM  7/13/2017         CORONARY STENT EA ADDL VESSEL  7/13/2017         CORONARY STENT SINGLE W/PTCA  7/13/2017         LUMBAR SPINE SURGERY Right 1/15/2024    RIGHT LUMBAR THREE/FOUR LAMINECTOMY performed by Ovi Dominguez MD at [facility] MAIN OR    LV ANGIOGRAPHY  7/13/2017         MODERATE SEDATION  7/13/2017         TONSILLECTOMY          Current Outpatient Medications   Medication Sig Dispense Refill    folic acid (FOLVITE) 1 MG tablet Take 0.75 mg by mouth daily      aspirin 81 MG EC tablet Take 1 tablet by mouth daily      rosuvastatin (CRESTOR) 10 MG tablet take 1 tablet by mouth once daily      gabapentin (NEURONTIN) 300 MG capsule Take 1 capsule by mouth 3 times daily.

## (undated) DEVICE — ELECTRODE PT RET AD L9FT HI MOIST COND ADH HYDRGEL CORDED

## (undated) DEVICE — WATERPROOF, BACTERIA PROOF DRESSING WITH ABSORBENT SEE THROUGH PAD: Brand: OPSITE POST-OP VISIBLE 15X10CM CTN 20

## (undated) DEVICE — GLOVE SURG SZ 8 CRM LTX FREE POLYISOPRENE POLYMER BEAD ANTI

## (undated) DEVICE — WAX SURG 2.5GM HEMSTAT BNE BEESWAX PARAFFIN ISO PALMITATE

## (undated) DEVICE — SUTURE STRATAFIX SYMMETRIC PDS + SZ 2-0 L18IN ABSRB VLT SXPP1A403

## (undated) DEVICE — ADHESIVE SKIN CLOSURE WND 8.661X1.5 IN 22 CM LIQUIBAND SECUR

## (undated) DEVICE — Device

## (undated) DEVICE — KIT OR TURNOVER

## (undated) DEVICE — APPLICATOR MEDICATED 26 CC SOLUTION HI LT ORNG CHLORAPREP

## (undated) DEVICE — GLOVE SURG SZ 85 L12IN FNGR THK79MIL GRN LTX FREE

## (undated) DEVICE — JACKSON TABLE POSITIONER KIT: Brand: MEDLINE INDUSTRIES, INC.

## (undated) DEVICE — 3.0MM PRECISION NEURO (MATCH HEAD)

## (undated) DEVICE — SOLUTION IRRIG 1000ML 0.9% SOD CHL USP POUR PLAS BTL

## (undated) DEVICE — SUTURE MCRYL SZ 3-0 L27IN ABSRB UD L24MM PS-1 3/8 CIR PRIM Y936H

## (undated) DEVICE — GLOVE SURG SZ 65 CRM LTX FREE POLYISOPRENE POLYMER BEAD ANTI

## (undated) DEVICE — GLOVE SURG SZ 65 L12IN FNGR THK79MIL GRN LTX FREE